# Patient Record
Sex: FEMALE | Race: WHITE | Employment: FULL TIME | URBAN - METROPOLITAN AREA
[De-identification: names, ages, dates, MRNs, and addresses within clinical notes are randomized per-mention and may not be internally consistent; named-entity substitution may affect disease eponyms.]

---

## 2019-09-16 ENCOUNTER — OFFICE VISIT (OUTPATIENT)
Dept: ORTHOPEDIC SURGERY | Age: 54
End: 2019-09-16

## 2019-09-16 VITALS
HEART RATE: 83 BPM | OXYGEN SATURATION: 97 % | BODY MASS INDEX: 34.13 KG/M2 | TEMPERATURE: 98.2 F | SYSTOLIC BLOOD PRESSURE: 129 MMHG | WEIGHT: 225.2 LBS | DIASTOLIC BLOOD PRESSURE: 87 MMHG | RESPIRATION RATE: 12 BRPM | HEIGHT: 68 IN

## 2019-09-16 DIAGNOSIS — R29.898 RIGHT LEG WEAKNESS: ICD-10-CM

## 2019-09-16 DIAGNOSIS — M54.16 LUMBAR RADICULOPATHY: Primary | ICD-10-CM

## 2019-09-16 DIAGNOSIS — M51.36 DDD (DEGENERATIVE DISC DISEASE), LUMBAR: ICD-10-CM

## 2019-09-16 DIAGNOSIS — M47.816 LUMBAR FACET ARTHROPATHY: ICD-10-CM

## 2019-09-16 RX ORDER — IBUPROFEN 800 MG/1
1 TABLET ORAL
COMMUNITY
Start: 2019-07-25 | End: 2020-12-22

## 2019-09-16 RX ORDER — METHYLPREDNISOLONE 4 MG/1
TABLET ORAL
Qty: 1 DOSE PACK | Refills: 0 | Status: SHIPPED | OUTPATIENT
Start: 2019-09-16 | End: 2019-11-04 | Stop reason: ALTCHOICE

## 2019-09-16 RX ORDER — DIAZEPAM 2 MG/1
1 TABLET ORAL
COMMUNITY
Start: 2019-07-24

## 2019-09-16 RX ORDER — TRAMADOL HYDROCHLORIDE 50 MG/1
50 TABLET ORAL
COMMUNITY
Start: 2019-05-11 | End: 2020-03-31 | Stop reason: SDUPTHER

## 2019-09-16 RX ORDER — PREDNISONE 5 MG/1
TABLET ORAL
COMMUNITY
Start: 2019-08-19 | End: 2019-09-16 | Stop reason: ALTCHOICE

## 2019-09-16 RX ORDER — TOPIRAMATE 25 MG/1
TABLET ORAL
Qty: 90 TAB | Refills: 1 | Status: ON HOLD | OUTPATIENT
Start: 2019-09-16 | End: 2019-11-20

## 2019-09-16 RX ORDER — ERGOCALCIFEROL 1.25 MG/1
50000 CAPSULE ORAL
COMMUNITY
Start: 2019-05-07

## 2019-09-16 NOTE — PROGRESS NOTES
[facility]  Valerie Good[address] [place] [address], [facility] [address]  Phone: [phone]  Fax: [phone]    NEW PATIENT  Pt's YOB: 1965    ASSESSMENT   Diagnoses and all orders for this visit:    1. Lumbar radiculopathy  -     REFERRAL TO PHYSICAL THERAPY  -     topiramate (TOPAMAX) 25 mg tablet; Take 1 in the evening for 1 week, then increase to 2 the second week and continue with 3 in the evening    2. Right leg weakness  -     REFERRAL TO PHYSICAL THERAPY    3. DDD (degenerative disc disease), lumbar  -     REFERRAL TO PHYSICAL THERAPY    4. Lumbar facet arthropathy  -     REFERRAL TO PHYSICAL THERAPY  -     methylPREDNISolone (MEDROL DOSEPACK) 4 mg tablet; Per dose pack instructions         IMPRESSION AND PLAN:  Hilda Villeda is a 48 y.o. female with history of neck and lower back pain. Pt complains of constant pain in the right calf when sleeping and notes that she generally wakes up during the night due to the pain. She admits to weakness in the legs when sitting for prolonged periods of time. Pt has been taking ibuprofen as needed. 1) Pt was given information on herniated disc exercises. 2) She was referred [facility] for lumbar pain stabilization and core strengthening. 3) Discussed ordering a right lower extremity EMG if weakness persists or worsens. 4) Pt was prescribed Topamax 25 mg 3 tabs QHS, tapering up as directed. 5) A lumbar MRI was ordered. She has progressive right leg weakness x 9 months in an L5 distribution. 6) Ms. Gabby Nails has a reminder for a \"due or due soon\" health maintenance. I have asked that she contact her primary care provider, Xochitl Jaramillo MD, for follow-up on this health maintenance. 7)  demonstrated consistency with prescribing. Follow-up and Dispositions    · Return in about 4 weeks (around 10/14/2019) for PT follow up, Medication follow up.            HISTORY OF PRESENT ILLNESS:  Franck Perez Desire Rice is a 48 y.o. female with history of neck and lower back pain. Pt presents to the office today as a new patient referred by Yousuf Ospina MD. Pt complains of constant pain in the right calf when sleeping and notes that she generally wakes up during the night due to the pain. Her symptoms worsen when sitting, lying down, coughing/sneezing, and lifting and improve when standing, walking, changing positions, and bending forward. Pt admits that her feet are constantly cool to touch but she denies any numbness in the feet. She notes that she fractured her left shoulder in 2006 which improved with physical therapy and a steroid injection. Pt reports that she then noticed right calf pain starting in 01/2019, followed up with her PCP, Dr. Jenn Sutton, and had a vascular ablation in 04/2019. She notes that her calf pain continued after the ablation so she was referred to [facility]. Pt admits to weakness in the legs when sitting for prolonged periods of time. She notes neck pain since 09/13/2019 and states that she originally thought the pain was due how she slept the night before. Pt has been taking ibuprofen as needed. She admits to relief when taking oral steroids. Pt at this time desires to proceed with medication evaluation and physical therapy. 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     Pain Scale: 10 - Worst pain ever/10     PCP: Yousuf Ospina MD    Past Medical History:   Diagnosis Date    Endocrine disease     hypothyrodism    Environmental allergies     Hypertension         Social History     Socioeconomic History    Marital status:      Spouse name: Not on file    Number of children: Not on file    Years of education: Not on file    Highest education level: Not on file   Occupational History    Not on file   Social Needs    Financial resource strain: Not on file    Food insecurity:     Worry: Not on file     Inability: Not on file    Transportation needs:     Medical: Not on file     Non-medical: Not on file   Tobacco Use    Smoking status: Never Smoker    Smokeless tobacco: Never Used   Substance and Sexual Activity    Alcohol use: No    Drug use: No    Sexual activity: Not on file   Lifestyle    Physical activity:     Days per week: Not on file     Minutes per session: Not on file    Stress: Not on file   Relationships    Social connections:     Talks on phone: Not on file     Gets together: Not on file     Attends [employer] service: Not on file     Active member of club or organization: Not on file     Attends meetings of clubs or organizations: Not on file     Relationship status: Not on file    Intimate partner violence:     Fear of current or ex partner: Not on file     Emotionally abused: Not on file     Physically abused: Not on file     Forced sexual activity: Not on file   Other Topics Concern     Service Not Asked    Blood Transfusions Not Asked    Caffeine Concern Not Asked    Occupational Exposure Not Asked   [place] Hazards Not Asked    Sleep Concern Not Asked    Stress Concern Not Asked    Weight Concern Not Asked    Special Diet Not Asked    Back Care Not Asked    Exercise Not Asked    Bike Helmet Not Asked    Seat Belt Not Asked    Self-Exams Not Asked   Social History Narrative    Not on file       Current Outpatient Medications   Medication Sig Dispense Refill    ergocalciferol (ERGOCALCIFEROL) 50,000 unit capsule Take 50,000 Units by mouth every seven (7) days.  ibuprofen (MOTRIN) 800 mg tablet Take 1 Tab by mouth every eight (8) hours as needed.  topiramate (TOPAMAX) 25 mg tablet Take 1 in the evening for 1 week, then increase to 2 the second week and continue with 3 in the evening 90 Tab 1    methylPREDNISolone (MEDROL DOSEPACK) 4 mg tablet Per dose pack instructions 1 Dose Pack 0    lisinopril (PRINIVIL, ZESTRIL) 20 mg tablet Take 1 Tab by mouth daily.       levothyroxine (SYNTHROID) 125 mcg tablet Take 125 mcg by mouth Daily (before breakfast).  loratadine (CLARITIN) 10 mg tablet Take 1 Tab by mouth daily.  diazePAM (VALIUM) 2 mg tablet Take 1 Tab by mouth daily as needed.  traMADol (ULTRAM) 50 mg tablet Take 50 mg by mouth every four (4) hours as needed.  HYDROcodone-acetaminophen (NORCO) 5-325 mg per tablet Take 1 Tab by mouth every four (4) hours as needed for Pain. Max Daily Amount: 6 Tabs. (Patient not taking: Reported on 9/16/2019) 12 Tab 0    FLUoxetine (PROZAC) 20 mg capsule Take 20 mg by mouth daily. Allergies   Allergen Reactions    Acetaminophen Other (comments)    Aspirin Shortness of Breath    Morphine Shortness of Breath    Penicillins Unknown (comments)       REVIEW OF SYSTEMS    Constitutional: Negative for fever, chills, or weight change. Respiratory: Negative for cough or shortness of breath. Cardiovascular: Negative for chest pain or palpitations. Gastrointestinal: Negative for acid reflux, change in bowel habits, or constipation. Genitourinary: Negative for dysuria and flank pain. Musculoskeletal: Positive for lumbar pain and right leg weakness. Skin: Negative for rash. Neurological: Negative for headaches, dizziness, or numbness. Endo/Heme/Allergies: Negative for increased bruising. Psychiatric/Behavioral: Positive for difficulty with sleep. PHYSICAL EXAMINATION  Visit Vitals  /87   Pulse 83   Temp 98.2 °F (36.8 °C) (Oral)   Resp 12   Ht 5' 7.5\" (1.715 m)   Wt 225 lb 3.2 oz (102.2 kg)   SpO2 97%   BMI 34.75 kg/m²       Constitutional: Awake, alert, and in no acute distress. HEENT: Normocephalic. Atraumatic. Oropharynx is moist and clear. PERRL. EOMI. Sclerae are nonicteric  Cardiovascular: Regular rate and rhythm  Lungs: Clear to auscultation bilaterally  Abdomen: Soft and nontender. Bowel sounds are present  Neurological: 1+ symmetrical DTRs in the upper extremities. 1+ symmetrical DTRs in the lower extremities.  Sensation to light touch is intact. Negative Andrade's sign bilaterally. Skin: warm, dry, and intact. Musculoskeletal: Tenderness to palpation in the lower lumbar region, Moderate pain with extension and axial loading. Improvement with forward flexion. No pain with internal or external rotation of her hips. Positive straight leg raise on the right; negative on the left. Difficulty with heel and toe walking. No difficulty with the single leg stand bilaterally. Biceps  Triceps Deltoids Wrist Ext Wrist Flex Hand Intrin   Right +4/5 +4/5 +4/5 +4/5 +4/5 +4/5   Left +4/5 +4/5 +4/5 +4/5 +4/5 +4/5      Hip Flex  Quads Hamstrings Ankle DF EHL Ankle PF   Right +4/5 +4/5 +4/5 -4/5 +4/5 +4/5   Left +4/5 +4/5 +4/5 +4/5 +4/5 +4/5     IMAGING:    Lumbar spine x-rays from 07/23/2019 were personally reviewed with the patient and demonstrated:  FINDINGS:    VERTEBRAE AND ALIGNMENT: Segments are normal in height. 1 mm anterolisthesis is present at L4-5, alignment is otherwise within normal range. DISC SPACES: L2-3, L3-4, L4-5 discs are modestly narrowed with small marginal osteophytes. L1-2 and L5-S1 discs are slightly narrowed. PARASPINOUS SOFT TISSUES: Unremarkable. ADDITIONAL: Multilevel facet arthropathy is present, most notable on the left at L4-5 and L5-S1. No pars defects. IMPRESSION    Multilevel degenerative disc disease and facet arthropathy. Minimal grade 1 degenerative anterolisthesis at L4-5. Lumbar spine MRI from 04/24/2019 was personally reviewed with the patient and demonstrated:  IMPRESSION:  Multilevel spondylotic changes as described below. Comment: Multiplanar, multisequence magnetic resonance images of the lumbar spine are produced for evaluation. Spinal cord, conus medullaris, and cauda equina maintain normal signal characteristics and appearance. The L2-L3 level sows slight intraspinal disk protrusion but without evidence of the neural structural impingements.     The L3-L4 disk shows mild posterior intraspinal disk protrusion but without evidence of neural structure impressions. The L4-L5 disk shows right lateral protrusion with impression on the right L4 nerve root. The L5-S1 disk shows left posterolateral annular tear but without evidence of neural structure impressions. Generalized decreased T2 signal or noted within the L2-L3, L3-L4, L4-L5, and L5-S1 disks. There is narrowing of the L2-L3 and L3-L4 disk spaces in line with degenerative changes. Anterior extraspinal protrusions of the L2-L3, L3-L4, and L4-L5 disks are preserved. Endplate spurrings are noted anteriorly adjacent to these disk spaces. The right L4-L5 facet joint  Shows an extraspinal small synovial cyst.    The left L4-L5 and L5-S1 facets showed extraspinal synovial cysts. Prevertebral, paraspinal, and postvertebral  Soft tissues are otherwise unremarkable. Osseous structures and marrow spaces are without evidence of suspicious signal abnormalities. Focal fatty change is incidentally noted within the visualized portion of the T11 posterior central vertebral body. Written by Yordy Norris, as dictated by Castillo Catherine MD.  I, Dr. Castillo Catherine confirm that all documentation is accurate.

## 2019-09-16 NOTE — PATIENT INSTRUCTIONS
Herniated Disc: Exercises  Introduction  Here are some examples of exercises for you to try. The exercises may be suggested for a condition or for rehabilitation. Start each exercise slowly. Ease off the exercises if you start to have pain. You will be told when to start these exercises and which ones will work best for you. How to stay safe  These exercises can help you move easier and feel better. But when you first start doing them, you may have more pain in your back. This is normal. But it is important to pay close attention to your pain during and after each exercise. · Keep doing these exercises if your pain stays the same or moves from your leg and buttock more toward the middle of your spine. Pain moving out of your leg and buttock is a good sign. · Stop doing these exercises if your pain gets worse in your leg and buttock. Stop if you start to have pain in your leg and buttock that you didn't have before. Be sure to do these exercises in the order they appear. Note how your pain changes before you move to the next one. If your pain is much worse right after exercise and stays worse the next day, do not do any of these exercises. 1. Rest on belly  1. Rest on belly    1. Lie on your stomach, with your head turned to the side. ? Keep your arms beside your body. ? If that position bothers your neck, place your hands, one on top of the other, underneath your forehead. This will help support your head and neck. 2. Try to relax your lower back muscles as much as you can. 3. Continue to lie on your stomach for 2 minutes. 2. Press-up back extension    1. Lie on your stomach, with your face down and your elbows tucked into your sides and under your shoulders. 2. Press your elbows down into the floor to raise your upper back. ? As you do this, relax your stomach muscles and allow your back to arch without using your back muscles. ? Let your low back relax completely as you arch up.  Don't let your hips or pelvis come off the floor. 3. Hold this position for 15 to 30 seconds. Then relax, and return to the start position. Over time, work up to staying in the press-up position for up to 2 minutes. 4. Repeat 2 to 4 times. 3. Full press-up back extension    1. Lie on your stomach with your face down, keeping your elbows tucked into your sides and under your shoulders. 2. Straighten your elbows, and push your upper body up as far as you can. Allow your lower back to sag. Keep your hips, pelvis, and legs relaxed. 3. Hold this position for 5 seconds, and then relax. 4. Repeat 10 times. Each time, try to raise your upper body a little higher and hold your arms a bit straighter. 4. Backward bend    1. Stand with your feet hip-width apart, and don't lock your knees. Your toes should be pointing forward. 2. Place your hands in the small of your back. 3. Bend backward as far as you can, keeping your knees straight. Hold this position for 2 to 3 seconds. Then return to your starting position. 4. Repeat 2 to 4 times. Each time, try to bend backward a little farther, until you bend backward as far as you can. Follow-up care is a key part of your treatment and safety. Be sure to make and go to all appointments, and call your doctor if you are having problems. It's also a good idea to know your test results and keep a list of the medicines you take. Where can you learn more? Go to http://rei-ayan.info/. Enter [phone] in the search box to learn more about \"Herniated Disc: Exercises. \"  Current as of: September 20, 2018  Content Version: 12.1  © 0741-2239 Healthwise, [employer]. Care instructions adapted under license by [employer] (which disclaims liability or warranty for this information).  If you have questions about a medical condition or this instruction, always ask your healthcare professional. [employer] disclaims any warranty or liability for your use of this information.

## 2019-09-16 NOTE — LETTER
9/18/19 Patient: Sri Miller YOB: 1965 Date of Visit: 9/16/2019 Jelly Mckenna MD 
[place] [address] VIA Facsimile: [phone] Dear Jelly Mckenna MD, Thank you for referring Ms. Sri Miller to [facility] for evaluation. My notes for this consultation are attached. If you have questions, please do not hesitate to call me. I look forward to following your patient along with you. Sincerely, Harmony Almeida MD

## 2019-09-30 ENCOUNTER — HOSPITAL ENCOUNTER (OUTPATIENT)
Dept: PHYSICAL THERAPY | Age: 54
Discharge: HOME | End: 2019-09-30
Payer: OTHER GOVERNMENT

## 2019-09-30 PROCEDURE — 97161 PT EVAL LOW COMPLEX 20 MIN: CPT

## 2019-09-30 PROCEDURE — 97110 THERAPEUTIC EXERCISES: CPT

## 2019-09-30 PROCEDURE — 97530 THERAPEUTIC ACTIVITIES: CPT

## 2019-09-30 NOTE — PROGRESS NOTES
[facility]  [address] [facility] [address]  [place], [address]  [phone] fax    Plan of Care/ Statement of Necessity for Physical Therapy Services  Patient name: Kim Arias Start of Care: 2019   Referral source: Luther Thompson MD : 1965    Medical Diagnosis: Low back pain [M54.5]  Payor:  / Plan: Elizabeth Kilpatrick / Product Type: Basil Betancourt /  Onset Date: 2019    Treatment Diagnosis: LBP, right LE pain and numbness   Prior Hospitalization: see medical history Provider#: [id number]   Medications: Verified on Patient summary List    Comorbidities: depression, HTN, thyroid problems, venous reflux - had ablation on right LE in 2019, hx LBP   Prior Level of Function: Independent with ADLs, functional, and work tasks with no limitations     The Plan of Care and following information is based on the information from the initial evaluation. Assessment/ key information:   Pt is a 48year old female who presents to therapy today with LBP with right LE pain/numbness. Pt states that her symptoms began in 2019 with insidious onset. Pt reports getting a new mattress in 2019 as well. Pt reports having pain on the lateral lower right LE that only occurs at night. Pt reports having venous reflux and had an ablation on the right LE in 2019. Pt reports that this ablation helped with some of the pain but it is still present at night. Pt reports taking the medications that the MD prescribed which does help some of the LE pain but reports worsening of her LBP. Pt states she woke up one morning a few weeks ago with neck pain as well but stated that the meds helped with this. Pt also states she noticed tingling in B hands/feet yesterday with yard work tasks (states she had these symptoms before but yesterday was the first day it was consistent). Pt reports having foot drop on the right LE at times.  Pt demonstrated decreased strength, impaired core stability, and impaired reflexes. Unable facilitate B UE/LE reflexes except B achilles which was 1+. Pt denies having any bowel/bladder incontinence but reports having urgency of bladder symptoms but reports she does not void herself at times when she has this urgency. Negative B harvey's sign noted. Significant weakness is noted with MMT to the right LE and right shoulder flex/ABD. Pt would benefit from physical therapy to improve the above impairments to help the pt return to performing ADLs, functional and recreational activities. Evaluation Complexity History HIGH Complexity :3+ comorbidities / personal factors will impact the outcome/ POC ; Examination MEDIUM Complexity : 3 Standardized tests and measures addressing body structure, function, activity limitation and / or participation in recreation  ;Presentation MEDIUM Complexity : Evolving with changing characteristics  ; Clinical Decision Making LOW Complexity : FOTO score of   Overall Complexity Rating: LOW   Problem List: pain affecting function, decrease ROM, decrease strength, impaired gait/ balance, decrease ADL/ functional abilitiies, decrease activity tolerance, decrease flexibility/ joint mobility and decrease transfer abilities   Treatment Plan may include any combination of the following: Therapeutic exercise, Therapeutic activities, Neuromuscular re-education, Physical agent/modality, Gait/balance training, Manual therapy, Aquatic therapy, Patient education, Self Care training, Functional mobility training, Home safety training and Stair training  Patient / Family readiness to learn indicated by: asking questions, trying to perform skills and interest  Persons(s) to be included in education: patient (P)  Barriers to Learning/Limitations: None  Patient Goal (s): sleeping through the night  Patient Self Reported Health Status: good  Rehabilitation Potential: fair    Short Term Goals:  To be accomplished in 2 treatments:  1. Pt will report compliance and independence to [facility] to help the pt manage their pain and symptoms. Eval: established   Long Term Goals: To be accomplished in 10 treatments: 1. Pt will increase MMT right knee EXT to 4/5, right ankle DF/PF/INV to 4-/5, right knee flex to 4-/5, right hip ABD to 4-/5 to improve ability to tolerate work tasks. 2. Pt will report being able to sleep through the night without awakening secondary to pain in the low back or right LE to improve sleep quality. 3. Pt will report having no stumbles over the past month secondary to foot drop to improve safety with gait. 4. Pt will report at least 50% improvement in overall symptoms/pain to improve activity tolerance and ease of ADLs. Frequency / Duration: Patient to be seen 1-2 times per week for 10 treatments. Patient/ Caregiver education and instruction: Diagnosis, prognosis, self care, activity modification and exercises   [x]  Plan of care has been reviewed with JESSICA Parsons, PT 9/30/2019 6:30 PM  _____________________________________________________________________  I certify that the above Therapy Services are being furnished while the patient is under my care. I agree with the treatment plan and certify that this therapy is necessary.     Physician's Signature:____________________  Date:__________Time:______    Please sign and return to [facility]  [address] [facility] [facility] [address]  [place], [address]  [phone] fax

## 2019-09-30 NOTE — PROGRESS NOTES
PT DAILY TREATMENT NOTE  10-18    Patient Name: Lincoln Stanton  Date:2019  : 1965  [x]  Patient  Verified  Payor: KEELEY / Plan: Gonsalo Raines 76 / Product Type: [place] /    In time:5:05  Out time:6:14  Total Treatment Time (min): 69  Visit #: 1 of 10    Medicare/BCBS Only   Total Timed Codes (min):  n/a 1:1 Treatment Time:  n/a     Treatment Area: Low back pain [M54.5]    SUBJECTIVE  Pain Level (0-10 scale): 4  Any medication changes, allergies to medications, adverse drug reactions, diagnosis change, or new procedure performed?: [x] No    [] Yes (see summary sheet for update)  Subjective functional status/changes:   [] No changes reported  See POC    OBJECTIVE    34 min [x]Eval                  []Re-Eval     10 min Therapeutic Exercise:  [] See flow sheet : HEP instruction and demonstration   Rationale: increase ROM and increase strength to improve the patients ability to tolerate ADLs    25 min Therapeutic Activity:  []  See flow sheet : pt education regarding anatomy and physiology of the spinal core/spine/LEs/UEs and how it relates to the pt's condition, pt education on how aquatic therapy can help improve pain and mobility, pt education on monitoring her symptoms in her B hands/feet and to contact the MD regarding this and her neck pain, pt education on avoiding activities that increase her pain/symptoms   Rationale: increase ROM, increase strength and decrease pain/symptoms  to improve the patients ability to tolerate functional tasks. With   [x] TE   [x] TA   [] neuro   [] other: Patient Education: [x] Review HEP    [] Progressed/Changed HEP based on:   [] positioning   [] body mechanics   [] transfers   [] heat/ice application    [] other:      Other Objective/Functional Measures: See evaluation. Pain Level (0-10 scale) post treatment: 4    ASSESSMENT/Changes in Function: Pt given HEP handout to perform. Pt understood exercises in HEP handout.  Pt demonstrated decreased strength, impaired core stability, and impaired reflexes. Unable facilitate B UE/LE reflexes except B achilles which was 1+. Pt denies having any bowel/bladder incontinence but reports having urgency of bladder symptoms but reports she does not void herself at times when she has this urgency. Negative B harvey's sign noted. Significant weakness is noted with MMT to the right LE and right shoulder flex/ABD. Educated pt to mention her neck symptoms to her MD along with the B hand/feet tingling. Pt would benefit from physical therapy to improve the above impairments to help the pt return to performing ADLs, functional and recreational activities. Patient will continue to benefit from skilled PT services to modify and progress therapeutic interventions, address functional mobility deficits, address ROM deficits, address strength deficits, analyze and address soft tissue restrictions, analyze and cue movement patterns, analyze and modify body mechanics/ergonomics, assess and modify postural abnormalities, address imbalance/dizziness and instruct in home and community integration to attain remaining goals. [x]  See Plan of Care  []  See progress note/recertification  []  See Discharge Summary         Progress towards goals / Updated goals:  Short Term Goals: To be accomplished in 2 treatments:  1. Pt will report compliance and independence to [facility] to help the pt manage their pain and symptoms. Eval: established   Long Term Goals: To be accomplished in 10 treatments: 1. Pt will increase MMT right knee EXT to 4/5, right ankle DF/PF/INV to 4-/5, right knee flex to 4-/5, right hip ABD to 4-/5 to improve ability to tolerate work tasks. 2. Pt will report being able to sleep through the night without awakening secondary to pain in the low back or right LE to improve sleep quality. 3. Pt will report having no stumbles over the past month secondary to foot drop to improve safety with gait.    4. Pt will report at least 50% improvement in overall symptoms/pain to improve activity tolerance and ease of ADLs.      PLAN  [x]  Upgrade activities as tolerated     [x]  Continue plan of care  [x]  Update interventions per flow sheet       []  Discharge due to:_  []  Other:_      Marli Kelly, PT 9/30/2019  6;30 PM    Future Appointments   Date Time Provider Gian Baxter   9/30/2019  5:00 PM Elizabeth Greer PT [facility]   11/4/2019  2:15 PM Anatoly Leal  [address] [facility]

## 2019-10-04 ENCOUNTER — HOSPITAL ENCOUNTER (OUTPATIENT)
Dept: PHYSICAL THERAPY | Age: 54
Discharge: HOME | End: 2019-10-04
Payer: OTHER GOVERNMENT

## 2019-10-04 PROCEDURE — 97113 AQUATIC THERAPY/EXERCISES: CPT

## 2019-10-04 NOTE — PROGRESS NOTES
PT DAILY TREATMENT NOTE  10-18    Patient Name: Cinthia Manriquez  Date:10/4/2019  : 1965  [x]  Patient  Verified  Payor: KEELEY / Plan: Gonsalo Raines 76 / Product Type:  /    In time: 5:02   Out time: 5:35  Total Treatment Time (min): 33  Visit #: 2 of 10    Medicare/BCBS Only   Total Timed Codes (min):  n/a 1:1 Treatment Time:  n/a     Treatment Area: Low back pain [M54.5]    SUBJECTIVE  Pain Level (0-10 scale): 4  Any medication changes, allergies to medications, adverse drug reactions, diagnosis change, or new procedure performed?: [x] No    [] Yes (see summary sheet for update)  Subjective functional status/changes:   [] No changes reported  Pt reports fatigue with HEP exercises. Pt reports she notices increased tingling in her B feet (right>left) and B hands (right=left) as well. Pt reports that this tingling is sharp needles in the right foot and B hands. Pt reports noticing stress incontinence when she sneezes/laughs and has been watching her water intake from seeing a nutritionist. Pt reports she was bit by a tick over the summer as well. Pt states she sees the MD on Monday. OBJECTIVE    33 min Therapeutic Exercise:  [x] See flow sheet : aqautics   Rationale: increase ROM and increase strength to improve the patients ability to tolerate ADLs          With   [x] TE   [x] TA   [] neuro   [] other: Patient Education: [x] Review HEP    [] Progressed/Changed HEP based on:   [] positioning   [] body mechanics   [] transfers   [] heat/ice application    [] other:      Other Objective/Functional Measures: Initiated exercises/interventions in flow sheet. Pain Level (0-10 scale) post treatment: 0    ASSESSMENT/Changes in Function: Reported no pain post session today. Educated pt to monitor her symptoms over the next few days and to mention these symptoms to her MD when she sees him on Monday. Educated pt to contact the MD before then if her symptoms worsen.  Mild-moderate instability noted with paddles exercises. We will plan on continuing therapy as tolerated to improve mobility and strength. Continue POC as tolerated. Patient will continue to benefit from skilled PT services to modify and progress therapeutic interventions, address functional mobility deficits, address ROM deficits, address strength deficits, analyze and address soft tissue restrictions, analyze and cue movement patterns, analyze and modify body mechanics/ergonomics, assess and modify postural abnormalities, address imbalance/dizziness and instruct in home and community integration to attain remaining goals. []  See Plan of Care  []  See progress note/recertification  []  See Discharge Summary         Progress towards goals / Updated goals:   Short Term Goals: To be accomplished in 2 treatments:  1. Pt will report compliance and independence to HEP to help the pt manage their pain and symptoms. Long Term Goals: To be accomplished in 10 treatments: 1. Pt will increase MMT right knee EXT to 4/5, right ankle DF/PF/INV to 4-/5, right knee flex to 4-/5, right hip ABD to 4-/5 to improve ability to tolerate work tasks. 2. Pt will report being able to sleep through the night without awakening secondary to pain in the low back or right LE to improve sleep quality. 3. Pt will report having no stumbles over the past month secondary to foot drop to improve safety with gait. 4. Pt will report at least 50% improvement in overall symptoms/pain to improve activity tolerance and ease of ADLs.      PLAN  [x]  Upgrade activities as tolerated     [x]  Continue plan of care  [x]  Update interventions per flow sheet       []  Discharge due to:_  []  Other:_      Kalina Anna, PT 10/4/2019  5:09 PM    Future Appointments   Date Time Provider Gian Baxter   10/11/2019  5:30 PM Mily Ragsdale, PT [facility]   10/14/2019 12:45 PM Mirella Polanco, PT [facility]   10/15/2019  4:30 PM Meryl Ramirez, PT [facility] 10/21/2019  5:00 PM Curtistine Awkward Dail Cogan, PT [facility]   10/25/2019  4:30 PM Curtistine Awkward Dail Cogan, PT [facility]   10/28/2019  5:00 PM Tawanda Skelton, PT [facility]   10/30/2019  5:00 PM Tawanda Skelton, PT [facility]   11/4/2019  2:15 PM Sherlyn Eisenberg  [address] [facility]

## 2019-10-11 ENCOUNTER — APPOINTMENT (OUTPATIENT)
Dept: PHYSICAL THERAPY | Age: 54
End: 2019-10-11
Payer: OTHER GOVERNMENT

## 2019-10-14 ENCOUNTER — APPOINTMENT (OUTPATIENT)
Dept: PHYSICAL THERAPY | Age: 54
End: 2019-10-14
Payer: OTHER GOVERNMENT

## 2019-10-15 ENCOUNTER — APPOINTMENT (OUTPATIENT)
Dept: PHYSICAL THERAPY | Age: 54
End: 2019-10-15
Payer: OTHER GOVERNMENT

## 2019-10-21 ENCOUNTER — HOSPITAL ENCOUNTER (OUTPATIENT)
Dept: PHYSICAL THERAPY | Age: 54
Discharge: HOME | End: 2019-10-21
Payer: OTHER GOVERNMENT

## 2019-10-21 PROCEDURE — 97113 AQUATIC THERAPY/EXERCISES: CPT

## 2019-10-21 NOTE — PROGRESS NOTES
PT DAILY TREATMENT NOTE  10-18    Patient Name: David Leal  Date:10/21/2019  : 1965  [x]  Patient  Verified  Payor:  / Plan: [facility]  [facility] [place] / Product Type:  /    In time: 5:01   Out time: 5:29  Total Treatment Time (min): 28  Visit #: 3 of 10    Medicare/BCBS Only   Total Timed Codes (min):  n/a 1:1 Treatment Time:  n/a     Treatment Area: Low back pain [M54.5]    SUBJECTIVE  Pain Level (0-10 scale): 6  Any medication changes, allergies to medications, adverse drug reactions, diagnosis change, or new procedure performed?: [x] No    [] Yes (see summary sheet for update)  Subjective functional status/changes:   [] No changes reported  Pt reports she is getting a MRI on her back and neck on Wednesday 10/23/2019. Pt reports she has noticed facial tingling as well. Pt reports fatigue with HEP exercises. OBJECTIVE    28 min Therapeutic Exercise:  [x] See flow sheet : aqautics   Rationale: increase ROM and increase strength to improve the patients ability to tolerate ADLs          With   [x] TE   [] TA   [] neuro   [] other: Patient Education: [x] Review HEP    [] Progressed/Changed HEP based on:   [] positioning   [] body mechanics   [] transfers   [] heat/ice application    [] other:      Other Objective/Functional Measures: good stability noted with paddles horizontal ABD/ADD. Pain Level (0-10 scale) post treatment: 5    ASSESSMENT/Changes in Function: Reported mild improvement in pain post session. Reported right lateral lower LE pain with standing exercises in the pool. Pt continues to demonstrate symptoms that may not be musculoskeletal in nature and educated pt to continue to mention her symptoms to her MD. Continue POC as tolerated.     Patient will continue to benefit from skilled PT services to modify and progress therapeutic interventions, address functional mobility deficits, address ROM deficits, address strength deficits, analyze and address soft tissue restrictions, analyze and cue movement patterns, analyze and modify body mechanics/ergonomics, assess and modify postural abnormalities, address imbalance/dizziness and instruct in home and community integration to attain remaining goals. []  See Plan of Care  []  See progress note/recertification  []  See Discharge Summary         Progress towards goals / Updated goals:   Short Term Goals: To be accomplished in 2 treatments:  1. Pt will report compliance and independence to [facility] to help the pt manage their pain and symptoms. reports compliance but states having fatigue with exercises 10/21/2019  Long Term Goals: To be accomplished in 10 treatments: 1. Pt will increase MMT right knee EXT to 4/5, right ankle DF/PF/INV to 4-/5, right knee flex to 4-/5, right hip ABD to 4-/5 to improve ability to tolerate work tasks. 2. Pt will report being able to sleep through the night without awakening secondary to pain in the low back or right LE to improve sleep quality. 3. Pt will report having no stumbles over the past month secondary to foot drop to improve safety with gait. 4. Pt will report at least 50% improvement in overall symptoms/pain to improve activity tolerance and ease of ADLs.      PLAN  [x]  Upgrade activities as tolerated     [x]  Continue plan of care  [x]  Update interventions per flow sheet       []  Discharge due to:_  []  Other:_      Josetta Alpers, PT 10/21/2019  5:11 PM    Future Appointments   Date Time Provider Gian Baxter   10/21/2019  5:00 PM Tiera Molina, PT [facility]   10/25/2019  4:30 PM Tyra Lyons, PT [facility]   10/28/2019  5:00 PM Tiera Molina, PT [facility]   10/30/2019  5:00 [facility]   11/4/2019  2:15 PM Ivett Egan  [address]

## 2019-10-25 ENCOUNTER — HOSPITAL ENCOUNTER (OUTPATIENT)
Dept: PHYSICAL THERAPY | Age: 54
Discharge: HOME | End: 2019-10-25
Payer: OTHER GOVERNMENT

## 2019-10-25 PROCEDURE — 97113 AQUATIC THERAPY/EXERCISES: CPT

## 2019-10-25 NOTE — PROGRESS NOTES
PT DAILY TREATMENT NOTE  10-18    Patient Name: Pablo Winston  Date:10/25/2019  : 1965  [x]  Patient  Verified  Payor:  / Plan: [facility]  [facility] / Product Type:  /    In time: 4:30   Out time: 5:01  Total Treatment Time (min): 31  Visit #: 4 of 10    Medicare/BCBS Only   Total Timed Codes (min):  n/a 1:1 Treatment Time:  n/a     Treatment Area: Low back pain [M54.5]    SUBJECTIVE  Pain Level (0-10 scale): 8 pain and sore in the low back  Any medication changes, allergies to medications, adverse drug reactions, diagnosis change, or new procedure performed?: [x] No    [] Yes (see summary sheet for update)  Subjective functional status/changes:   [] No changes reported  Pt states her pain is elevated today. Pt reports having increased tingling in the B feet>hands today. Pt reports having the MRI done and is awaiting the results. OBJECTIVE    31 min Therapeutic Exercise:  [x] See flow sheet : aqautics   Rationale: increase ROM and increase strength to improve the patients ability to tolerate ADLs          With   [x] TE   [] TA   [] neuro   [] other: Patient Education: [x] Review HEP    [] Progressed/Changed HEP based on:   [] positioning   [] body mechanics   [] transfers   [] heat/ice application    [] other:      Other Objective/Functional Measures: Held TM secondary to pt request. Added UTR to improve mobility. Reported tingling in the left foot with hipx3 ABD on the left. Performed open paddles with EXT/horizontal ABD/ADD today to improve balance and stability. Pain Level (0-10 scale) post treatment: 6    ASSESSMENT/Changes in Function: Reported improvement in pain post session. Pt does seem somewhat frustrated with limited progress towards pain and symptoms since the start of care. Dicussed with pt at length regarding her current symptoms and talking to her MD regarding her MRI results.  Pt reported that she did notice increased pain with AROM l/s EXT and less pain with flex AROM today and educated pt on performing repeated flex at home to improve pain and symptoms. We will plan on reassessing pt NV for PN. Continue POC as tolerated. Patient will continue to benefit from skilled PT services to modify and progress therapeutic interventions, address functional mobility deficits, address ROM deficits, address strength deficits, analyze and address soft tissue restrictions, analyze and cue movement patterns, analyze and modify body mechanics/ergonomics, assess and modify postural abnormalities, address imbalance/dizziness and instruct in home and community integration to attain remaining goals. []  See Plan of Care  []  See progress note/recertification  []  See Discharge Summary         Progress towards goals / Updated goals:   Short Term Goals: To be accomplished in 2 treatments:  1. Pt will report compliance and independence to HEP to help the pt manage their pain and symptoms. reports compliance but states having fatigue with exercises 10/21/2019  Long Term Goals: To be accomplished in 10 treatments: 1. Pt will increase MMT right knee EXT to 4/5, right ankle DF/PF/INV to 4-/5, right knee flex to 4-/5, right hip ABD to 4-/5 to improve ability to tolerate work tasks. 2. Pt will report being able to sleep through the night without awakening secondary to pain in the low back or right LE to improve sleep quality. 3. Pt will report having no stumbles over the past month secondary to foot drop to improve safety with gait. Continues to have tingling in the feet 10/25/2019  4. Pt will report at least 50% improvement in overall symptoms/pain to improve activity tolerance and ease of ADLs.      PLAN  [x]  Upgrade activities as tolerated     [x]  Continue plan of care  [x]  Update interventions per flow sheet       []  Discharge due to:_  []  Other:_      Hedy Tucker, PT 10/25/2019  4:34 PM    Future Appointments   Date Time Provider Gian Baxter   10/28/2019  5:00 PM Korina Sethi PT [facility]   10/30/2019  5:00 PM Abdi Olvera, PT [facility]   11/4/2019  2:15 PM Debby Peña  [address] 11/11/2019  2:15 PM Nathaniel Balderas, PT [facility]

## 2019-10-28 ENCOUNTER — APPOINTMENT (OUTPATIENT)
Dept: PHYSICAL THERAPY | Age: 54
End: 2019-10-28
Payer: OTHER GOVERNMENT

## 2019-10-30 ENCOUNTER — HOSPITAL ENCOUNTER (OUTPATIENT)
Dept: PHYSICAL THERAPY | Age: 54
End: 2019-10-30
Payer: OTHER GOVERNMENT

## 2019-11-04 ENCOUNTER — OFFICE VISIT (OUTPATIENT)
Dept: ORTHOPEDIC SURGERY | Age: 54
End: 2019-11-04

## 2019-11-04 VITALS
RESPIRATION RATE: 14 BRPM | OXYGEN SATURATION: 96 % | DIASTOLIC BLOOD PRESSURE: 77 MMHG | HEART RATE: 85 BPM | TEMPERATURE: 98.4 F | BODY MASS INDEX: 33.28 KG/M2 | WEIGHT: 219.6 LBS | HEIGHT: 68 IN | SYSTOLIC BLOOD PRESSURE: 117 MMHG

## 2019-11-04 DIAGNOSIS — M48.02 FORAMINAL STENOSIS OF CERVICAL REGION: ICD-10-CM

## 2019-11-04 DIAGNOSIS — R20.0 FACIAL NUMBNESS: ICD-10-CM

## 2019-11-04 DIAGNOSIS — M47.816 LUMBAR FACET ARTHROPATHY: Primary | ICD-10-CM

## 2019-11-04 DIAGNOSIS — R29.898 RIGHT LEG WEAKNESS: ICD-10-CM

## 2019-11-04 DIAGNOSIS — M62.838 MUSCLE SPASM: ICD-10-CM

## 2019-11-04 DIAGNOSIS — M79.18 MYOFASCIAL PAIN: ICD-10-CM

## 2019-11-04 NOTE — LETTER
11/6/19 Patient: Zhang Hernández YOB: 1965 Date of Visit: 11/4/2019 Stepan Borjas MD 
[place] [address] VIA Facsimile: [phone] Dear Stepan Borjas MD, Thank you for referring Ms. Cesar Matos to [facility] for evaluation. My notes for this consultation are attached. If you have questions, please do not hesitate to call me. I look forward to following your patient along with you. Sincerely, Chapis Quinteros MD

## 2019-11-04 NOTE — PATIENT INSTRUCTIONS
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
## 2019-11-11 ENCOUNTER — APPOINTMENT (OUTPATIENT)
Dept: PHYSICAL THERAPY | Age: 54
End: 2019-11-11

## 2019-11-19 NOTE — PROGRESS NOTES
[facility]  [address]  [place], [address]  [phone] fax    Physical Therapy Discharge Summary    Patient name: Alicia Epps Start of Care: 2019   Referral source: Sherley Rasmussen MD : 1965               Medical Diagnosis: Low back pain [M54.5]  Payor:  / Plan: [employer] / Product Type: [employer] /  Onset Date: 2019               Treatment Diagnosis: LBP, right LE pain and numbness   Prior Hospitalization: see medical history Provider#: [id number]   Medications: Verified on Patient summary List    Comorbidities: depression, HTN, thyroid problems, venous reflux - had ablation on right LE in 2019, hx LBP   Prior Level of Function: Independent with ADLs, functional, and work tasks with no limitations    Visits from Start of Care: 4    Missed Visits: 1  Reporting Period : 19 to 10/25/19      Summary of Care: Per chart review,  staff spoke with pt on 19 and pt stated she wanted to discharge from PT. Progress towards goals:   Short Term Goals:   1. Pt will report compliance and independence to HEP to help the pt manage their pain and symptoms. reports compliance but states having fatigue with exercises. Long Term Goals:   1.Pt will increase MMT right knee EXT to 4/5, right ankle DF/PF/INV to 4-/5, right knee flex to 4-/5, right hip ABD to 4-/5 to improve ability to tolerate work tasks. 2. Pt will report being able to sleep through the night without awakening secondary to pain in the low back or right LE to improve sleep quality. 3. Pt will report having no stumbles over the past month secondary to foot drop to improve safety with gait. Continues to have tingling in the feet. 4. Pt will report at least 50% improvement in overall symptoms/pain to improve activity tolerance and ease of ADLs.        ASSESSMENT/RECOMMENDATIONS: Unable to further assess progress towards goals at this time due to non-compliance/lack of attendance. DC at this time with no further instructions to the patient.   Thank you for this referral.    [x]Discontinue therapy: []Patient has reached or is progressing toward set goals      [x]Patient is non-compliant or has abdicated      []Due to lack of appreciable progress towards set goals    Manolo Spivey, PT 11/19/2019 10:13 AM

## 2019-11-20 ENCOUNTER — HOSPITAL ENCOUNTER (OUTPATIENT)
Age: 54
Setting detail: OUTPATIENT SURGERY
Discharge: HOME | End: 2019-11-20
Attending: PHYSICAL MEDICINE & REHABILITATION | Admitting: PHYSICAL MEDICINE & REHABILITATION
Payer: OTHER GOVERNMENT

## 2019-11-20 ENCOUNTER — APPOINTMENT (OUTPATIENT)
Dept: GENERAL RADIOLOGY | Age: 54
End: 2019-11-20
Attending: PHYSICAL MEDICINE & REHABILITATION
Payer: OTHER GOVERNMENT

## 2019-11-20 VITALS
SYSTOLIC BLOOD PRESSURE: 119 MMHG | BODY MASS INDEX: 33.19 KG/M2 | WEIGHT: 219 LBS | HEIGHT: 68 IN | DIASTOLIC BLOOD PRESSURE: 78 MMHG | HEART RATE: 87 BPM | OXYGEN SATURATION: 97 % | RESPIRATION RATE: 18 BRPM | TEMPERATURE: 98.5 F

## 2019-11-20 PROCEDURE — 77030039433 HC TY MYLEOGRAM BD -B: Performed by: PHYSICAL MEDICINE & REHABILITATION

## 2019-11-20 PROCEDURE — 74011250636 HC RX REV CODE- 250/636: Performed by: PHYSICAL MEDICINE & REHABILITATION

## 2019-11-20 PROCEDURE — 74011250637 HC RX REV CODE- 250/637: Performed by: PHYSICAL MEDICINE & REHABILITATION

## 2019-11-20 PROCEDURE — 74011000250 HC RX REV CODE- 250: Performed by: PHYSICAL MEDICINE & REHABILITATION

## 2019-11-20 PROCEDURE — 77030003676 HC NDL SPN MPRI -A: Performed by: PHYSICAL MEDICINE & REHABILITATION

## 2019-11-20 PROCEDURE — 76010000009 HC PAIN MGT 0 TO 30 MIN PROC: Performed by: PHYSICAL MEDICINE & REHABILITATION

## 2019-11-20 RX ORDER — LIDOCAINE HYDROCHLORIDE 10 MG/ML
INJECTION, SOLUTION EPIDURAL; INFILTRATION; INTRACAUDAL; PERINEURAL AS NEEDED
Status: DISCONTINUED | OUTPATIENT
Start: 2019-11-20 | End: 2019-11-20 | Stop reason: HOSPADM

## 2019-11-20 RX ORDER — DEXAMETHASONE SODIUM PHOSPHATE 100 MG/10ML
INJECTION INTRAMUSCULAR; INTRAVENOUS AS NEEDED
Status: DISCONTINUED | OUTPATIENT
Start: 2019-11-20 | End: 2019-11-20 | Stop reason: HOSPADM

## 2019-11-20 RX ORDER — DIAZEPAM 5 MG/1
5-20 TABLET ORAL ONCE
Status: COMPLETED | OUTPATIENT
Start: 2019-11-20 | End: 2019-11-20

## 2019-11-20 RX ADMIN — DIAZEPAM 10 MG: 5 TABLET ORAL at 12:57

## 2019-11-20 NOTE — H&P
Date of Surgery Update:  Henrry Velez was seen and examined. History and physical has been reviewed. The patient has been examined. There have been no significant clinical changes since the last office visit.       Signed By: Radha Courtney MD     November 20, 2019 1:16 PM

## 2019-11-20 NOTE — DISCHARGE INSTRUCTIONS
[facility]   ([place])  Dr. Adela Ahumada, Dr. Aracely Wilkinson, Dr. Vera Barron not drive a car, operate heavy machinery or dangerous equipment for 24 hours. * Activity as tolerated; rest for the remainder of the day. * Resume pre-block medications including those for your family doctor. * Do not drink alcoholic beverages for 24 hours. Alcohol and the medications you have received may interact and cause an adverse reaction. * You may feel better this evening and worse tomorrow, as the numbing medications wears off and the steroid has yet to begin to work. After 48 hrs the steroid should begin to release bringing you relief. * You may shower this evening and remove any bandages. * Avoid hot tubs and heating pads for 24 hours. You may use cold packs on the procedure site as tolerated for the first 24 hours. * If a headache develops, drink plenty of fluids and rest.  Take over the counter medications for headache if needed. If the headache continues longer than 24 hours, call MD at the [address] [place] [address]. [phone]    * Continue taking pain medications as needed. * You may resume your regular diet if tolerated. Otherwise, start with sips of water and advance slowly. * If Diabetic: check your blood sugar three times a day for the next 3 days. If your sugar is greater than 300 call your family doctor. If your sugar is greater than 400, have someone transport you to the nearest Emergency Room. * If you experience any of the following problems, Please Call the [address] [place] [address] at [phone].         * Shortness of Breath    * Fever of 101 or higher    * Nausea / Vomiting    * Severe Headache    * Weakness or numbness in arms or legs that is not      resolving    * Prolonged increase in pain greater than 4 days      DISCHARGE SUMMARY from Nurse      PATIENT INSTRUCTIONS:    After oral sedation, for 24 hours or while taking prescription Narcotics:  · Limit your activities  · Do not drive and operate hazardous machinery  · Do not make important personal or business decisions  · Do  not drink alcoholic beverages  · If you have not urinated within 8 hours after discharge, please contact your surgeon on call. Report the following to your surgeon:  · Excessive pain, swelling, redness or odor of or around the surgical area  · Temperature over 101  · Nausea and vomiting lasting longer than 4 hours or if unable to take medications  · Any signs of decreased circulation or nerve impairment to extremity: change in color, persistent  numbness, tingling, coldness or increase pain  · Any questions            What to do at Home:  Recommended activity: Activity as tolerated, NO DRIVING FOR 12 Hours post injection          *  Please give a list of your current medications to your Primary Care Provider. *  Please update this list whenever your medications are discontinued, doses are      changed, or new medications (including over-the-counter products) are added. *  Please carry medication information at all times in case of emergency situations. These are general instructions for a healthy lifestyle:    No smoking/ No tobacco products/ Avoid exposure to second hand smoke    Surgeon General's Warning:  Quitting smoking now greatly reduces serious risk to your health. Obesity, smoking, and sedentary lifestyle greatly increases your risk for illness    A healthy diet, regular physical exercise & weight monitoring are important for maintaining a healthy lifestyle    You may be retaining fluid if you have a history of heart failure or if you experience any of the following symptoms:  Weight gain of 3 pounds or more overnight or 5 pounds in a week, increased swelling in our hands or feet or shortness of breath while lying flat in bed.   Please call your doctor as soon as you notice any of these symptoms; do not wait until your next office visit. Recognize signs and symptoms of STROKE:    F-face looks uneven    A-arms unable to move or move unevenly    S-speech slurred or non-existent    T-time-call 911 as soon as signs and symptoms begin-DO NOT go       Back to bed or wait to see if you get better-TIME IS BRAIN.

## 2019-11-20 NOTE — PROCEDURES
Facet Joint Block Procedure Note    Patient Name: Israel Louis    Date of Procedure: November 20, 2019    Preoperative Diagnosis: LUMBAR FACET ARTHROPATHY    Post Operative Diagnosis:LUMBAR FACET ARTHROPATHY     Procedure:  bilateral  L4-L5 Facet Joint Block    Consent: Informed consent was obtained prior to the procedure. The patient was given the opportunity to ask questions regarding the procedure and its associated risks. In addition to the potential risks associated with the procedure itself, the patient was informed both verbally and in writing of potential side effects of the use of glucocorticoids. The patient appeared to comprehend the informed consent and desired to have the procedure performed. Procedure: The patient was placed in the prone position on the flouroscopy table and the back was prepped and draped in the usual sterile manner. At each blocked level, the exact location of the facet joint was identified with flouroscopy, and after local Lidocaine 1% injection, a #22 gauge spinal needle was then advanced toward the joint. A total of 10 mg of preservative free Dexamethasone and 5 cc of Lidocaine was injected around and equally divided among all of the sites. The patient tolerated the procedure well. The injection area was cleaned and bandaids applied. No excessive bleeding was noted. Patient dressed and was discharged to home with instructions.        Jose Webster MD  November 20, 2019

## 2019-12-11 ENCOUNTER — OFFICE VISIT (OUTPATIENT)
Dept: ORTHOPEDIC SURGERY | Age: 54
End: 2019-12-11

## 2019-12-11 VITALS
TEMPERATURE: 98.1 F | WEIGHT: 222 LBS | DIASTOLIC BLOOD PRESSURE: 90 MMHG | SYSTOLIC BLOOD PRESSURE: 140 MMHG | HEART RATE: 72 BPM | RESPIRATION RATE: 18 BRPM | HEIGHT: 68 IN | BODY MASS INDEX: 33.65 KG/M2

## 2019-12-11 DIAGNOSIS — R20.0 NUMBNESS AND TINGLING OF BOTH LOWER EXTREMITIES: Primary | ICD-10-CM

## 2019-12-11 DIAGNOSIS — R20.2 NUMBNESS AND TINGLING OF BOTH LOWER EXTREMITIES: Primary | ICD-10-CM

## 2019-12-11 NOTE — PROGRESS NOTES
[address]  [address], [address]  [address]  Phone: [phone]  Fax: [phone]        Ken Pitts  : 1965  PCP: Gilford Lob, MD  2019    ELECTROMYOGRAPHY AND NERVE CONDUCTION STUDIES    Lucius Cantu was referred by Dr. Julieta Bolaños for electrodiagnostic evaluation of BLE paraesthesia. NCV & EMG Findings: All examined muscles (as indicated in the following table) showed no evidence of electrical instability. INTERPRETATION  This was a normal nerve conduction and EMG study showing there to be no signs of neuropathy, myopathy, or radiculopathy in the nerves and muscles tested. TECHNICAL LIMITATIONS  Soft tissue at points of stimulation resulted in distally low amplitudes, particularly at the peroneal nerves. This is not considered to be truly abnormal.     CLINICAL INTERPRETATION  Her electrodiagnostic findings do not provide explanation for her symptoms. HISTORY OF PRESENT ILLNESS  Lucius Cantu is a 48 y.o. female. Pt presents today for BLE EMG evaluation of BLE paraesthesia (R>L). She has h/o cervical and lumbar pain with intermittent numbness and tingling in her entire face, both hands, and both feet. She also c/o RLE weakness, especially after prolonged sitting. Pt reports that she has had numbness and tingling in her feet for many years. Pt notes that at night, she has significant pain in the lateral right calf. She has h/o blood clots and venous reflux. Pt notes that she attended aquatic PT that exacerbated her pain. She notes that she remains active by walking at least 3 miles per day and maintaining her land with her . Pt reports difficulty sleeping at night. Lumbar MRI 10/24/19: IMPRESSION:  1.  Mild to moderate degenerative disc disease, with slight malalignment at some levels. Degenerative facet arthropathy, severe at L4/5. Baastrup disease.     2. L4/5: Severe facet arthropathy with slight anterolisthesis, moderately narrows the spinal canal. Mild right foramen stenosis. 3. No high-grade canal or foramen stenosis at any other level. PAST MEDICAL HISTORY   Past Medical History:   Diagnosis Date    Endocrine disease     hypothyrodism    Environmental allergies     Hypertension        Past Surgical History:   Procedure Laterality Date    HX APPENDECTOMY      HX GYN      hysterectomy   . MEDICATIONS    Current Outpatient Medications   Medication Sig Dispense Refill    diazePAM (VALIUM) 2 mg tablet Take 1 Tab by mouth daily as needed.  ergocalciferol (ERGOCALCIFEROL) 50,000 unit capsule Take 50,000 Units by mouth every seven (7) days.  ibuprofen (MOTRIN) 800 mg tablet Take 1 Tab by mouth every eight (8) hours as needed.  traMADol (ULTRAM) 50 mg tablet Take 50 mg by mouth every four (4) hours as needed.  lisinopril (PRINIVIL, ZESTRIL) 20 mg tablet Take 1 Tab by mouth daily.  levothyroxine (SYNTHROID) 125 mcg tablet Take 125 mcg by mouth Daily (before breakfast).  FLUoxetine (PROZAC) 20 mg capsule Take 20 mg by mouth daily.  loratadine (CLARITIN) 10 mg tablet Take 1 Tab by mouth daily.           ALLERGIES  Allergies   Allergen Reactions    Acetaminophen Other (comments)    Aspirin Shortness of Breath    Morphine Shortness of Breath    Penicillins Unknown (comments)          SOCIAL HISTORY    Social History     Socioeconomic History    Marital status:      Spouse name: Not on file    Number of children: Not on file    Years of education: Not on file    Highest education level: Not on file   Tobacco Use    Smoking status: Never Smoker    Smokeless tobacco: Never Used   Substance and Sexual Activity    Alcohol use: No    Drug use: No   Other Topics Concern       FAMILY HISTORY  Family History   Problem Relation Age of Onset    No Known Problems Mother     No Known Problems Father          PHYSICAL EXAMINATION  Visit Vitals  /90   Pulse 72   Temp 98.1 °F (36.7 °C) (Oral)   Resp 18   Ht 5' 8\" (1.727 m)   Wt 222 lb (100.7 kg)   BMI 33.75 kg/m²       Pain Assessment  11/4/2019   Location of Pain Neck;Back   Location Modifiers -   Severity of Pain 6   Quality of Pain Aching; Sharl Guarneri; Throbbing;Dull   Quality of Pain Comment -   Duration of Pain Persistent   Frequency of Pain Constant   Limiting Behavior Yes   Result of Injury No           Constitutional:  Well developed, well nourished, in no acute distress. Psychiatric: Affect and mood are appropriate. Integumentary: No rashes or abrasions noted on exposed areas. SPINE/MUSCULOSKELETAL EXAM    On brief examination: Neurologically intact.     MOTOR:      Biceps  Triceps Deltoids Wrist Ext Wrist Flex Hand Intrin   Right 5/5 5/5 5/5 5/5 5/5 5/5   Left 5/5 5/5 5/5 5/5 5/5 5/5             Hip Flex  Quads Hamstrings Ankle DF EHL Ankle PF   Right 5/5 5/5 5/5 5/5 5/5 5/5   Left 5/5 5/5 5/5 5/5 5/5 5/5     NCV & EMG Findings:  Nerve Conduction Studies  Anti Sensory Summary Table     Stim Site NR Peak (ms) Norm Peak (ms) O-P Amp (µV) Norm O-P Amp Site1 Site2 Delta-P (ms) Dist (cm) Tera (m/s) Norm Tera (m/s)   Left Sup Fibular Anti Sensory (Ant Lat Mall)   14 cm    3.7 <4.4 6.0 >5.0 14 cm Ant Lat Mall 3.7 14.0 38 >32   Site 2    3.7  7.2          Right Sup Fibular Anti Sensory (Ant Lat Mall)   14 cm    3.2 <4.4 6.9 >5.0 14 cm Ant Lat Mall 3.2 14.0 44 >32   Site 2    3.3  5.4          Left Sural Anti Sensory (Lat Mall)   Calf    3.2 <4.0 5.9 >5.0 Calf Lat Mall 3.2 14.0 44 >35   Right Sural Anti Sensory (Lat Mall)   Calf    3.8 <4.0 7.1 >5.0 Calf Lat Mall 3.8 14.0 37 >35   Site 2    3.9  3.9            Motor Summary Table     Stim Site NR Onset (ms) Norm Onset (ms) O-P Amp (mV) Norm O-P Amp Site1 Site2 Delta-0 (ms) Dist (cm) Tera (m/s) Norm Tera (m/s)   Left Fibular Motor (Ext Dig Brev)   Ankle    4.5 <6.1 1.1 >2.5 B Fib Ankle 5.9 0.0  >38   B Fib    10.4  2.6  Poplt B Fib 1.2 0.0  >40 Poplt    11.6  2.7          Right Fibular Motor (Ext Dig Brev)   Ankle    5.2 <6.1 1.6 >2.5 B Fib Ankle 5.7 30.5 54 >38   B Fib    10.9  2.8  Poplt B Fib 0.9 6.0 67 >40   Poplt    11.8  3.3          Left Tibial Motor (Abd Rodriguez Brev)   Ankle    4.2 <6.1 4.0 >3.0 Knee Ankle 6.9 37.0 54 >35   Knee    11.1  2.9          Right Tibial Motor (Abd Rodriguez Brev)   Ankle    4.0 <6.1 3.5 >3.0 Knee Ankle 7.7 35.0 45 >35   Knee    11.7  1.2            EMG     Side Muscle Nerve Root Ins Act Fibs Psw Amp Dur Poly Recrt Int Ardith Donath Comment   Left VastusMed Femoral L2-4 Nml Nml Nml Nml Nml 0 Nml Nml    Left AntTibialis Dp Br Fibular L4-5 Nml Nml Nml Nml Nml 0 Nml Nml    Left Gastroc Tibial S1-2 Nml Nml Nml Nml Nml 0 Nml Nml    Right VastusMed Femoral L2-4 Nml Nml Nml Nml Nml 0 Nml Nml    Right AntTibialis Dp Br Fibular L4-5 Nml Nml Nml Nml Nml 0 Nml Nml    Right Gastroc Tibial S1-2 Nml Nml Nml Nml Nml 0 Nml Nml    Left Lumbo Parasp Up Rami L1-2 Nml Nml Nml         Left Lumbo Parasp Mid Rami L3-4 Nml Nml Nml         Left Lumbo Parasp Low Rami L5-S1 Nml Nml Nml         Right Lumbo Parasp Up Rami L1-2 Nml Nml Nml         Right Lumbo Parasp Mid Rami L3-4 Nml Nml Nml         Right Lumbo Parasp Low Rami L5-S1 Nml Nml Nml         Left ExtHallLong Dp Br Fibular L5, S1 Nml Nml Nml Nml Nml 0 Nml Nml    Left PostTibialis Tibial L5, S1 Nml Nml Nml Nml Nml 0 Nml Nml    Right ExtHallLong Dp Br Fibular L5, S1 Nml Nml Nml Nml Nml 0 Nml Nml    Right PostTibialis Tibial L5, S1 Nml Nml Nml Nml Nml 0 Nml Nml        Nerve Conduction Studies  Anti Sensory Left/Right Comparison     Stim Site L Lat (ms) R Lat (ms) L-R Lat (ms) L Amp (µV) R Amp (µV) L-R Amp (%) Site1 Site2 L Tera (m/s) R Tera (m/s) L-R Tera (m/s)   Sup Fibular Anti Sensory (Ant Lat Mall)   14 cm 3.7 3.2 0.5 6.0 6.9 13.0 14 cm Ant Lat Mall 38 44 6   Site 2 3.7 3.3 0.4 7.2 5.4 25.0        Sural Anti Sensory (Lat Mall)   Calf 3.2 3.8 0.6 5.9 7.1 16.9 Calf Lat Mall 44 37 7     Motor Left/Right Comparison     Stim Site L Lat (ms) R Lat (ms) L-R Lat (ms) L Amp (mV) R Amp (mV) L-R Amp (%) Site1 Site2 L Tera (m/s) R Tera (m/s) L-R Tera (m/s)   Fibular Motor (Ext Dig Brev)   Ankle 4.5 5.2 0.7 1.1 1.6 31.3 B Fib Ankle  54    B Fib 10.4 10.9 0.5 2.6 2.8 7.1 Poplt B Fib  67    Poplt 11.6 11.8 0.2 2.7 3.3 18.2        Tibial Motor (Abd Rodriguez Brev)   Ankle 4.2 4.0 0.2 4.0 3.5 12.5 Knee Ankle 54 45 9   Knee 11.1 11.7 0.6 2.9 1.2 58.6              Waveforms:                            [facility]  OFFICE PROCEDURE PROGRESS NOTE        Chart reviewed for the following:   Daphne WORRELL, have reviewed the History, Physical and updated the Allergic reactions for [address] [place] [address] performed immediately prior to start of procedure:   IDaphne, have performed the following reviews [facility] to the start of the procedure:            * Patient was identified by name and date of birth   * Agreement on procedure being performed was verified  * Risks and Benefits explained to the patient  * Procedure site verified and marked as necessary  * Patient was positioned for comfort  * Consent was signed and verified     Time: 3:14 PM      Date of procedure: 12/11/2019    Procedure performed by:  Pelon Kc MD    Provider accompanied by: Shahla. Patient accompanied by: None.     How tolerated by patient: tolerated the procedure well with no complications    Post Procedural Pain Scale: 2 - Hurts Little Bit    Comments: none    Written by Georgena Buerger, [address] as dictated by Daphne Braga MD

## 2019-12-11 NOTE — LETTER
12/11/19 Patient: Pearly Dubin YOB: 1965 Date of Visit: 12/11/2019 Evelio Tejada MD 
[place] [address] [address] [place] [address] VIA Facsimile: [phone] Meredith Daugherty, [address]  [address] [address] VIA In Basket Dear MD Meredith Galvan MD, Thank you for referring Ms. Mikhail Vallecillo to [facility] for evaluation. My notes for this consultation are attached. If you have questions, please do not hesitate to call me. I look forward to following your patient along with you.  
 
 
Sincerely, 
 
Won Fine MD

## 2019-12-16 ENCOUNTER — OFFICE VISIT (OUTPATIENT)
Dept: ORTHOPEDIC SURGERY | Age: 54
End: 2019-12-16

## 2019-12-16 VITALS
RESPIRATION RATE: 16 BRPM | OXYGEN SATURATION: 97 % | SYSTOLIC BLOOD PRESSURE: 127 MMHG | BODY MASS INDEX: 33.95 KG/M2 | DIASTOLIC BLOOD PRESSURE: 85 MMHG | HEART RATE: 78 BPM | HEIGHT: 68 IN | TEMPERATURE: 98 F | WEIGHT: 224 LBS

## 2019-12-16 DIAGNOSIS — M79.2 NEURITIS: ICD-10-CM

## 2019-12-16 DIAGNOSIS — R29.898 RIGHT HAND WEAKNESS: ICD-10-CM

## 2019-12-16 DIAGNOSIS — M79.604 RIGHT LEG PAIN: ICD-10-CM

## 2019-12-16 DIAGNOSIS — R20.0 BILATERAL HAND NUMBNESS: ICD-10-CM

## 2019-12-16 DIAGNOSIS — M79.89 SWELLING OF RIGHT LOWER EXTREMITY: ICD-10-CM

## 2019-12-16 DIAGNOSIS — M47.816 LUMBAR FACET ARTHROPATHY: Primary | ICD-10-CM

## 2019-12-16 DIAGNOSIS — M62.838 MUSCLE SPASM: ICD-10-CM

## 2019-12-16 DIAGNOSIS — M25.571 RIGHT ANKLE PAIN, UNSPECIFIED CHRONICITY: ICD-10-CM

## 2019-12-16 RX ORDER — GABAPENTIN 300 MG/1
CAPSULE ORAL
Qty: 90 CAP | Refills: 1 | Status: SHIPPED | OUTPATIENT
Start: 2019-12-16 | End: 2020-02-10

## 2019-12-16 RX ORDER — TRAMADOL HYDROCHLORIDE 50 MG/1
50 TABLET ORAL
Qty: 28 TAB | Refills: 0 | Status: SHIPPED | OUTPATIENT
Start: 2019-12-16 | End: 2020-02-17 | Stop reason: SDUPTHER

## 2019-12-16 NOTE — PROGRESS NOTES
[facility]  [address], [facility] [address]  Phone: [phone]  Fax: [phone]    Pt's YOB: 1965    ASSESSMENT   Diagnoses and all orders for this visit:    1. Lumbar facet arthropathy    2. Swelling of right lower extremity  -     DUPLEX LOWER EXT VENOUS RIGHT; Future    3. Right hand weakness  -     EMG TWO EXTREMITIES UPPER; Future    4. Bilateral hand numbness  -     EMG TWO EXTREMITIES UPPER; Future    5. Right ankle pain, unspecified chronicity  -     traMADol (ULTRAM) 50 mg tablet; Take 1 Tab by mouth every six (6) hours as needed (Severe Pain) for up to 7 days. Max Daily Amount: 200 mg.    6. Neuritis  -     gabapentin (NEURONTIN) 300 mg capsule; Take 1 cap by mouth in the morning and 2 at night as directed. 7. Muscle spasm    8. Right leg pain         IMPRESSION AND PLAN:  Penelope Rodriguez is a 48 y.o. female with history of cervical and lumbar pain. Pt complains of intermittent numbness and tingling in the her entire face and in the entirety of both hands. She also complains of pain in the lower back, weakness in the right leg, and numbness/tingling in her feet x many years. She has not been followed by a neurologist and reports stomach upset when taking Topamax. 1) Pt was given information on cervical and lumbar arthritis exercises. 2) She was prescribed Neurontin 300 mg 1 tab QAM and 2 tabs QHS tapering up as directed. 3) Pt received a refill of Ultram 50 mg 1 tab QHS prn pain. 4) A bilateral upper extremity EMG was ordered to assess for entrapment and cervical radiculopathy. 5) A right lower extremity doppler study was ordered -- pt has increased swelling in her right leg which she reports has had 2 blood clots previously  6) Ms. Evelyn Azul has a reminder for a \"due or due soon\" health maintenance.  I have asked that she contact her primary care provider, Kendal Stout MD, for follow-up on this health maintenance. 7)  demonstrated consistency with prescribing. 8) Pt has facial numbness, hand numbness, and right leg pain with history of radicular symptoms, discussed at length her various symptoms and recent normal right leg EMG; consider evaluation by neurology pending current testing and treatment. Follow-up and Dispositions    · Return in about 4 weeks (around 1/13/2020) for Medication follow up, Diagnostic Test follow up. HISTORY OF PRESENT ILLNESS:  Cassie Lin is a 48 y.o. female with history of cervical and lumbar pain and presents to the office today for EMG follow up. Pt complains of intermittent numbness and tingling in the her entire face and numbness that extends from the elbows into the entirety of both hands. Her hand numbness is most severe during the day. Pt also complains of pain in the lower back, weakness in the right leg, and numbness/tingling in her feet x many years. She reports a history of 2 blood clots in the right leg in the past and notes that her current sympotms are different from what she experienced with previous blood clots. She reports relief with a bilateral L4-5 facet injection on 11/20/2019 but still has pain in her right lower leg near her ankle. Pt notes severe pain located in the right lower leg when she is laying in bed at night, which interferes with her sleep. She denies any leg pain with ambulation or when standing. She notes that she occasionally stumbles when walking. Pt admits to an episode of dizziness this week and notes that she felt as if she was leaning sideways while standing straight. Pt reports that she has not been followed by a neurologist. She reports stomach upset with Topamax and notes that her PCP, Geoff Romero MD suggested she try pregabalin. Pt denies ever taking Neurontin. She takes Ultram 50 mg as needed for pain with benefit. Pt at this time desires to proceed with referral to neurology and medication evaluation. Pain Scale: 7/10    PCP: Jimenez Beard MD     Past Medical History:   Diagnosis Date    Endocrine disease     hypothyrodism    Environmental allergies     Hypertension         Social History     Socioeconomic History    Marital status:      Spouse name: Not on file    Number of children: Not on file    Years of education: Not on file    Highest education level: Not on file   Occupational History    Not on file   Social Needs    Financial resource strain: Not on file    Food insecurity:     Worry: Not on file     Inability: Not on file    Transportation needs:     Medical: Not on file     Non-medical: Not on file   Tobacco Use    Smoking status: Never Smoker    Smokeless tobacco: Never Used   Substance and Sexual Activity    Alcohol use: No    Drug use: No    Sexual activity: Not on file   Lifestyle    Physical activity:     Days per week: Not on file     Minutes per session: Not on file    Stress: Not on file   Relationships    Social connections:     Talks on phone: Not on file     Gets together: Not on file     Attends Scientologist service: Not on file     Active member of club or organization: Not on file     Attends meetings of clubs or organizations: Not on file     Relationship status: Not on file    Intimate partner violence:     Fear of current or ex partner: Not on file     Emotionally abused: Not on file     Physically abused: Not on file     Forced sexual activity: Not on file   Other Topics Concern     Service Not Asked    Blood Transfusions Not Asked    Caffeine Concern Not Asked    Occupational Exposure Not Asked   Royanne Angela Hazards Not Asked    Sleep Concern Not Asked    Stress Concern Not Asked    Weight Concern Not Asked    Special Diet Not Asked    Back Care Not Asked    Exercise Not Asked    Bike Helmet Not Asked   [address] Not Asked    Self-Exams Not Asked   Social History Narrative    Not on file       Current Outpatient Medications Medication Sig Dispense Refill    gabapentin (NEURONTIN) 300 mg capsule Take 1 cap by mouth in the morning and 2 at night as directed. 90 Cap 1    traMADol (ULTRAM) 50 mg tablet Take 1 Tab by mouth every six (6) hours as needed (Severe Pain) for up to 7 days. Max Daily Amount: 200 mg. 28 Tab 0    ibuprofen (MOTRIN) 800 mg tablet Take 1 Tab by mouth every eight (8) hours as needed.  traMADol (ULTRAM) 50 mg tablet Take 50 mg by mouth every four (4) hours as needed.  lisinopril (PRINIVIL, ZESTRIL) 20 mg tablet Take 1 Tab by mouth daily.  levothyroxine (SYNTHROID) 125 mcg tablet Take 125 mcg by mouth Daily (before breakfast).  FLUoxetine (PROZAC) 20 mg capsule Take 20 mg by mouth daily.  loratadine (CLARITIN) 10 mg tablet Take 1 Tab by mouth daily.  diazePAM (VALIUM) 2 mg tablet Take 1 Tab by mouth daily as needed.  ergocalciferol (ERGOCALCIFEROL) 50,000 unit capsule Take 50,000 Units by mouth every seven (7) days. Allergies   Allergen Reactions    Acetaminophen Other (comments)    Aspirin Shortness of Breath    Morphine Shortness of Breath    Penicillins Unknown (comments)         REVIEW OF SYSTEMS    Constitutional: Negative for fever, chills, or weight change. Respiratory: Negative for cough or shortness of breath. Cardiovascular: Negative for chest pain or palpitations. Gastrointestinal: Negative for acid reflux, change in bowel habits, or constipation. Genitourinary: Negative for dysuria and flank pain. Musculoskeletal: Positive for cervical and lumbar pain. Positive for weakness in the right leg. Skin: Negative for rash. Neurological: Negative for headaches or dizziness. Positive for facial numbness and numbness in the hands. Endo/Heme/Allergies: Negative for increased bruising. Psychiatric/Behavioral: Positive for difficulty with sleep.       PHYSICAL EXAMINATION  Visit Vitals  /85 (BP 1 Location: Left arm, BP Patient Position: Sitting) Pulse 78   Temp 98 °F (36.7 °C) (Oral)   Resp 16   Ht 5' 8\" (1.727 m)   Wt 224 lb (101.6 kg)   SpO2 97%   BMI 34.06 kg/m²       Constitutional: Awake, alert, and in no acute distress. Neurological: 1+ symmetrical DTRs in the upper extremities. 1+ symmetrical DTRs in the lower extremities. Sensation to light touch is intact. Negative Andrade's sign bilaterally. Skin: warm, dry, and intact. Musculoskeletal: Negative Tinel's test bilaterally. Negative Phalen's test bilaterally. Tenderness to palpation in the lower lumbar region. Moderate pain with extension, axial loading, and forward flexion. No pain with internal or external rotation of her hips. Negative straight leg raise bilaterally      Biceps  Triceps Deltoids Wrist Ext Wrist Flex Hand Intrin   Right +4/5 +4/5 +4/5 +4/5 +4/5 +4/5   Left +4/5 +4/5 +4/5 +4/5 +4/5 +4/5      Hip Flex  Quads Hamstrings Ankle DF EHL Ankle PF   Right +4/5 +4/5 +4/5 -3/5 +4/5 +4/5   Left +4/5 +4/5 +4/5 +4/5 +4/5 +4/5     IMAGING:    Cervical spine MRI from 10/23/2019 was personally reviewed with the patient and demonstrated:  IMPRESSION:  1. Mild to moderate cervical degenerative disc disease and moderate to severe facet arthropathy. 2. C4/5: Mild canal stenosis and cord deformity. Severe left foramen stenosis. 3. No high-grade canal or foramen stenosis at any other level.     Lumbar spine MRI from 10/23/2019 was personally reviewed with the patient and demonstrated:  IMPRESSION:  1. Mild to moderate degenerative disc disease, with slight malalignment at some levels. degenerative facet arthropathy, severe at L4/5. Baastrup disease. 2. L4/5: Severe facet arthropathy with slight anterolisthesis, moderately narrows the spinal canal. Mild right foramen stenosis. 3. No high-grade canal or foramen stenosis at any other level. Bilateral lower extremity EMG from 12/11/2019 was personally reviewed with the patient and demonstrated:  NCV & EMG Findings:   All examined muscles (as indicated in the following table) showed no evidence of electrical instability.      INTERPRETATION  This was a normal nerve conduction and EMG study showing there to be no signs of neuropathy, myopathy, or radiculopathy in the nerves and muscles tested. Written by Luisa Caldwell, as dictated by Nimisha Parsons MD.  I, Dr. Nimisha Parsons confirm that all documentation is accurate.

## 2019-12-16 NOTE — PATIENT INSTRUCTIONS

## 2019-12-16 NOTE — LETTER
12/18/19 Patient: Srinath Padgett YOB: 1965 Date of Visit: 12/16/2019 Fredrick Carrera MD 
[address] [place] [address] VIA Facsimile: [phone] Dear Fredrick Carrera MD, Thank you for referring Ms. Vitaliy Pitts to [facility] for evaluation. My notes for this consultation are attached. If you have questions, please do not hesitate to call me. I look forward to following your patient along with you. Sincerely, Pb Wall MD

## 2019-12-20 ENCOUNTER — DOCUMENTATION ONLY (OUTPATIENT)
Dept: ORTHOPEDIC SURGERY | Age: 54
End: 2019-12-20

## 2019-12-20 NOTE — PROGRESS NOTES
Order and office notes faxed to [facility] Scheduling to have them set up Venous Study for Right Lower Ext @ [facility] and to notify the patient of date. They will authorize with the insurance if needed. Patient aware.

## 2020-01-31 ENCOUNTER — OFFICE VISIT (OUTPATIENT)
Dept: ORTHOPEDIC SURGERY | Age: 55
End: 2020-01-31

## 2020-01-31 VITALS
HEIGHT: 68 IN | DIASTOLIC BLOOD PRESSURE: 76 MMHG | TEMPERATURE: 98 F | HEART RATE: 70 BPM | BODY MASS INDEX: 33.71 KG/M2 | WEIGHT: 222.4 LBS | RESPIRATION RATE: 20 BRPM | SYSTOLIC BLOOD PRESSURE: 120 MMHG | OXYGEN SATURATION: 98 %

## 2020-01-31 DIAGNOSIS — R20.0 NUMBNESS AND TINGLING IN BOTH HANDS: Primary | ICD-10-CM

## 2020-01-31 DIAGNOSIS — R20.2 NUMBNESS AND TINGLING IN BOTH HANDS: Primary | ICD-10-CM

## 2020-01-31 NOTE — PROGRESS NOTES
[address]  [address]  [address]  Phone: [phone]  Fax: [phone]        Tonda Gottron  : 1965  PCP: Chandan Fu MD  2020    ELECTROMYOGRAPHY AND NERVE CONDUCTION STUDIES    Tiffani Pope was referred by Dr. Sujata Johnson for electrodiagnostic evaluation of bilateral hand numbness and tingling. NCV & EMG Findings:  All nerve conduction studies (as indicated in the following tables) were within normal limits. All left vs. right side differences were within normal limits. All examined muscles (as indicated in the following table) showed no evidence of electrical instability. INTERPRETATION  This was a normal nerve conduction and EMG study showing there to be no signs of neuropathy, myopathy, or radiculopathy in the nerves and muscles tested. CLINICAL INTERPRETATION  Her electrodiagnostic findings do not appear to explain her symptoms. HISTORY OF PRESENT ILLNESS  Tiffani Pope is a 48 y.o. female. Pt presents today for BUE EMG evaluation of bilateral hand numbness and tingling (L>R) with weakness in the LUE. She reports difficulty opening things with her left hand. Pt notes that she has experienced swelling in her hands and discoloration. She has been unable to wear her rings. Pt reports that over the last week, she has begun experiencing tremors in both hands. Pt notes that she also experiences tingling in her face. Pt notes that Gabapentin has improved her LE symptoms and she is now able to sleep better. She previously underwent a BLE EMG that was benign. PAST MEDICAL HISTORY   Past Medical History:   Diagnosis Date    Endocrine disease     hypothyrodism    Environmental allergies     Hypertension        Past Surgical History:   Procedure Laterality Date    HX APPENDECTOMY      HX GYN      hysterectomy   .       MEDICATIONS    Current Outpatient Medications   Medication Sig Dispense Refill    gabapentin (NEURONTIN) 300 mg capsule Take 1 cap by mouth in the morning and 2 at night as directed. 90 Cap 1    diazePAM (VALIUM) 2 mg tablet Take 1 Tab by mouth daily as needed.  ergocalciferol (ERGOCALCIFEROL) 50,000 unit capsule Take 50,000 Units by mouth every seven (7) days.  ibuprofen (MOTRIN) 800 mg tablet Take 1 Tab by mouth every eight (8) hours as needed.  traMADol (ULTRAM) 50 mg tablet Take 50 mg by mouth every four (4) hours as needed.  lisinopril (PRINIVIL, ZESTRIL) 20 mg tablet Take 1 Tab by mouth daily.  levothyroxine (SYNTHROID) 125 mcg tablet Take 125 mcg by mouth Daily (before breakfast).  FLUoxetine (PROZAC) 20 mg capsule Take 20 mg by mouth daily.  loratadine (CLARITIN) 10 mg tablet Take 1 Tab by mouth daily.           ALLERGIES  Allergies   Allergen Reactions    Acetaminophen Other (comments)    Aspirin Shortness of Breath    Morphine Shortness of Breath    Penicillins Unknown (comments)          SOCIAL HISTORY    Social History     Socioeconomic History    Marital status:      Spouse name: Not on file    Number of children: Not on file    Years of education: Not on file    Highest education level: Not on file   Tobacco Use    Smoking status: Never Smoker    Smokeless tobacco: Never Used   Substance and Sexual Activity    Alcohol use: No    Drug use: No   Other Topics Concern       FAMILY HISTORY  Family History   Problem Relation Age of Onset    No Known Problems Mother     No Known Problems Father          PHYSICAL EXAMINATION  Visit Vitals  /76 (BP 1 Location: Right arm, BP Patient Position: Sitting)   Pulse 70   Temp 98 °F (36.7 °C) (Oral)   Resp 20   Ht 5' 8\" (1.727 m)   Wt 222 lb 6.4 oz (100.9 kg)   SpO2 98%   BMI 33.82 kg/m²       Pain Assessment  1/31/2020   Location of Pain Arm   Pain Location Comment -   Location Modifiers -   Severity of Pain -   Quality of Pain (No Data)   Quality of Pain Comment \"numbness, weakness, tingling in left hand\"   Duration of Pain Persistent   Frequency of Pain Constant   Aggravating Factors -   Aggravating Factors Comment -   Limiting Behavior -   Relieving Factors -   Relieving Factors Comment -   Result of Injury No           Constitutional:  Well developed, well nourished, in no acute distress. Psychiatric: Affect and mood are appropriate. Integumentary: No rashes or abrasions noted on exposed areas. SPINE/MUSCULOSKELETAL EXAM    On brief examination: Allodynia of hands.       NCV & EMG Findings:  Nerve Conduction Studies  Anti Sensory Summary Table     Stim Site NR Peak (ms) Norm Peak (ms) O-P Amp (µV) Norm O-P Amp Site1 Site2 Delta-P (ms) Dist (cm) Tera (m/s) Norm Tera (m/s)   Left Median Anti Sensory (2nd Digit)   Wrist    3.3 <3.6 112.0 >10 Wrist 2nd Digit 3.3 14.0 42 >39   Right Median Anti Sensory (2nd Digit)   Wrist    3.4 <3.6 110.0 >10 Wrist 2nd Digit 3.4 14.0 41 >39   Left Radial Anti Sensory (Base 1st Digit)   Wrist    2.0 <3.1 27.9  Wrist Base 1st Digit 2.0 0.0     Right Radial Anti Sensory (Base 1st Digit)   Wrist    2.0 <3.1 41.2  Wrist Base 1st Digit 2.0 0.0     Left Ulnar Anti Sensory (5th Digit)   Wrist    3.5 <3.7 83.9 >15.0 Wrist 5th Digit 3.5 14.0 40 >38   Right Ulnar Anti Sensory (5th Digit)   Wrist    3.4 <3.7 75.4 >15.0 Wrist 5th Digit 3.4 14.0 41 >38     Motor Summary Table     Stim Site NR Onset (ms) Norm Onset (ms) O-P Amp (mV) Norm O-P Amp Site1 Site2 Delta-0 (ms) Dist (cm) Tera (m/s) Norm Tera (m/s)   Left Median Motor (Abd Poll Brev)   Wrist    3.4 <4.2 7.0 >5 Elbow Wrist 4.2 22.5 54 >50   Elbow    7.6  6.5          Right Median Motor (Abd Poll Brev)   Wrist    3.3 <4.2 11.6 >5 Elbow Wrist 4.1 23.5 57 >50   Elbow    7.4  11.5          Left Ulnar Motor (Abd Dig Min)   Wrist    2.9 <4.2 13.1 >3 B Elbow Wrist 3.2 19.0 59 >53   B Elbow    6.1  12.5  A Elbow B Elbow 1.6 10.0 62 >53   A Elbow    7.7  12.8          Right Ulnar Motor (Abd Dig Min) Wrist    2.8 <4.2 12.8 >3 B Elbow Wrist 3.4 20.0 59 >53   B Elbow    6.2  11.4  A Elbow B Elbow 1.5 10.0 67 >53   A Elbow    7.7  11.8            EMG     Side Muscle Nerve Root Ins Act Fibs Psw Amp Dur Poly Recrt Int [facility] Comment   Right Deltoid Axillary C5-6 Nml Nml Nml Nml Nml 0 Nml Nml    Right Biceps Musculocut C5-6 Nml Nml Nml Nml Nml 0 Nml Nml    Right Triceps Radial C6-7-8 Nml Nml Nml Nml Nml 0 Nml Nml    Right PronatorTeres Median C6-7 Nml Nml Nml Nml Nml 0 Nml Nml    Right 1stDorInt Ulnar C8-T1 Nml Nml Nml Nml Nml 0 Nml Nml    Left Biceps Musculocut C5-6 Nml Nml Nml Nml Nml 0 Nml Nml    Left Triceps Radial C6-7-8 Nml Nml Nml Nml Nml 0 Nml Nml    Left PronatorTeres Median C6-7 Nml Nml Nml Nml Nml 0 Nml Nml    Left 1stDorInt Ulnar C8-T1 Nml Nml Nml Nml Nml 0 Nml Nml    Right Cervical Parasp Up Rami C1-3 Nml Nml Nml         Right Cervical Parasp Mid Rami C4-6 Nml Nml Nml         Right Cervical Parasp Low Rami C7-8 Nml Nml Nml         Left Cervical Parasp Up Rami C1-3 Nml Nml Nml         Left Cervical Parasp Mid Rami C4-6 Nml Nml Nml         Left Cervical Parasp Low Rami C7-8 Nml Nml Nml         Left Deltoid Axillary C5-6 Nml Nml Nml Nml Nml 0 Nml Nml        Nerve Conduction Studies  Anti Sensory Left/Right Comparison     Stim Site L Lat (ms) R Lat (ms) L-R Lat (ms) L Amp (µV) R Amp (µV) L-R Amp (%) Site1 Site2 L Tera (m/s) R Tera (m/s) L-R Tera (m/s)   Median Anti Sensory (2nd Digit)   Wrist 3.3 3.4 0.1 112.0 110.0 1.8 Wrist 2nd Digit 42 41 1   Radial Anti Sensory (Base 1st Digit)   Wrist 2.0 2.0 0.0 27.9 41.2 32.3 Wrist Base 1st Digit      Ulnar Anti Sensory (5th Digit)   Wrist 3.5 3.4 0.1 83.9 75.4 10.1 Wrist 5th Digit 40 41 1     Motor Left/Right Comparison     Stim Site L Lat (ms) R Lat (ms) L-R Lat (ms) L Amp (mV) R Amp (mV) L-R Amp (%) Site1 Site2 L Tera (m/s) R Tera (m/s) L-R Tera (m/s)   Median Motor (Abd Poll Brev)   Wrist 3.4 3.3 0.1 7.0 11.6 39.7 Elbow Wrist 54 57 3   Elbow 7.6 7.4 0.2 6.5 11.5 43.5 Ulnar Motor (Abd Dig Min)   Wrist 2.9 2.8 0.1 13.1 12.8 2.3 B Elbow Wrist 59 59 0   B Elbow 6.1 6.2 0.1 12.5 11.4 8.8 A Elbow B Elbow 62 67 5   A Elbow 7.7 7.7 0.0 12.8 11.8 7.8              Waveforms:                                  [facility]  OFFICE PROCEDURE PROGRESS NOTE        Chart reviewed for the following:   Francesca WORRELL, have reviewed the History, Physical and updated the Allergic reactions for [address] [place] [address] performed immediately prior to start of procedure:   Francesca WORRELL, have performed the following reviews on [facility] prior to the start of the procedure:            * Patient was identified by name and date of birth   * Agreement on procedure being performed was verified  * Risks and Benefits explained to the patient  * Procedure site verified and marked as necessary  * Patient was positioned for comfort  * Consent was signed and verified     Time: 1:39 PM      Date of procedure: 1/31/2020    Procedure performed by:  Chadwick Camara MD    Provider accompanied by: Scribe. Patient accompanied by: Self.     How tolerated by patient: tolerated the procedure well with no complications    Post Procedural Pain Scale: 2 - Hurts Little Bit    Comments: none    Written by Selena Gunter, [address] as dictated by Francesca Sherman MD

## 2020-01-31 NOTE — LETTER
1/31/20 Patient: Ernestina Kemp YOB: 1965 Date of Visit: 1/31/2020 MD Ken[facility]sMobile [facility] HospitalleticiaPatrick [facility] [address] [address] [place] [address] VIA Facsimile: [phone] Juan Carlos Velasquez, [address]  [address] [facility] [address] VIA In Basket Dear MD Juan Carlos Walls MD, Thank you for referring Ms. Ari Dias to [facility] for evaluation. My notes for this consultation are attached. If you have questions, please do not hesitate to call me. I look forward to following your patient along with you.  
 
 
Sincerely, 
 
Masha Shah MD

## 2020-02-10 DIAGNOSIS — M79.2 NEURITIS: ICD-10-CM

## 2020-02-10 RX ORDER — GABAPENTIN 300 MG/1
CAPSULE ORAL
Qty: 90 CAP | Refills: 0 | Status: SHIPPED | OUTPATIENT
Start: 2020-02-10 | End: 2020-03-31 | Stop reason: SDUPTHER

## 2020-02-13 NOTE — PROGRESS NOTES
[facility]  [address] [place] [address], [facility] [address]  Phone: [phone]  Fax: [phone]    Pt's YOB: 1965    ASSESSMENT   Diagnoses and all orders for this visit:    1. Neuritis  -     gabapentin (NEURONTIN) 300 mg capsule; Take 2 Caps by mouth two (2) times a day. Max Daily Amount: 1,200 mg.    2. Right leg weakness    3. Lumbar facet arthropathy  -     traMADol (ULTRAM) 50 mg tablet; Take 1 Tab by mouth every eight (8) hours as needed (Severe Pain) for up to 7 days. Max Daily Amount: 150 mg.    4. Lumbar pain  -     traMADol (ULTRAM) 50 mg tablet; Take 1 Tab by mouth every eight (8) hours as needed (Severe Pain) for up to 7 days. Max Daily Amount: 150 mg.    5. Spinal stenosis of lumbar region without neurogenic claudication    6. Bilateral hand numbness         IMPRESSION AND PLAN:  Connor Lofton is a 48 y.o. right hand dominant female with history of cervical and lumbar pain. Pt notes episodes of swelling in the hands and notes that this generally improves over a span of 4 hours. She also complains of intermittent tremors in the hands and facial numbness/tingling. Pt takes Ultram 50 mg intermittently as her pain warrants and 300 mg 1 cap QAM and 2 caps QHS with improvement in her sleep and left leg pain. 1) Pt was given information on lumbar arthritis exercises. 2) She received a refill of Neurontin 300 mg 2 caps QAM and 2 caps QHS-- will increase to help with neuropathic symptoms   3) Pt received a refill of Ultram 50 mg 1 tab Q8 hours prn pain. 4) Discussed referral to neurology-- would recommend this for further evaluation of her diffuse neuropathic symptoms which are not explained by her MRI and EMG/NCS. Candy Russell 3) Ms. Yessenia Velarde has a reminder for a \"due or due soon\" health maintenance. I have asked that she contact her primary care provider, Kendra Stephenson MD, for follow-up on this health maintenance.   4)  demonstrated consistency with prescribing. Follow-up and Dispositions    · Return in about 4 weeks (around 3/16/2020) for Medication follow up. HISTORY OF PRESENT ILLNESS:  Dwight Beltran is a 48 y.o. right hand dominant female with history of cervical and lumbar pain and presents to the office today for EMG follow up. Pt notes episodes of swelling in the hands and notes that this generally improves over a span of 4 hours. She notes that she had been at work since 7 AM when she started to noticed swelling in the hands. Pt also complains of intermittent tremors in the hands. She notes that about 1 week ago when she was walking to work she experienced numbness in the left leg which caused her to stumble. Pt also notes intermittent facial numbness/tingling. She denies previously following up with neurology. Pt notes that she followed up with rheumatology, Dr. Mallorie Li, in 03/2019. She reports that the right lower extremity venous study ordered at her last office visit was never scheduled. Pt notes a history of 2 superficial blood clots in the right leg. She takes Ultram 50 mg intermittently as her pain warrants and notes that she last received a refill of from her PCP, Nayely Leyva MD. Pt is due to follow up with Dr. Deandra Mcgrath on Wednesday, 02/19/2020. She started Neurontin 300 mg 1 cap QAM and 2 caps QHS in mid 12/2019 and notes that she sleeps better while on the medication. Pt also notes improvement in her left leg pain since starting the Neurontin. She denies any worsening tremors in the hands since starting the Neurontin. Pt also denies any dizziness or floatiness with the Neurontin. She denies ever taking Lyrica. Multiple labs reviewed in Care Everywhere and recent thyroid studies were normal and CBC also was normal.  Labs from 03/13/2019 demonstrated ROSANNA speckled pattern of 1:640 and RNP of 1.7 which was elevated. All other labs reviewed were within the normal range.   Pt at this time desires to continue with current care. 30 minutes of face to face contact was spent with the patient during today's office visit extensively discussing her symptoms, medication, previous evaluation by rheumatology, labs, recent EMG/NCS  and treatment plan.       Pain Scale: 9/10    PCP: Sandi Morrison MD     Past Medical History:   Diagnosis Date    Endocrine disease     hypothyrodism    Environmental allergies     Hypertension         Social History     Socioeconomic History    Marital status:      Spouse name: Not on file    Number of children: Not on file    Years of education: Not on file    Highest education level: Not on file   Occupational History    Not on file   Social Needs    Financial resource strain: Not on file    Food insecurity:     Worry: Not on file     Inability: Not on file    Transportation needs:     Medical: Not on file     Non-medical: Not on file   Tobacco Use    Smoking status: Never Smoker    Smokeless tobacco: Never Used   Substance and Sexual Activity    Alcohol use: No    Drug use: No    Sexual activity: Not on file   Lifestyle    Physical activity:     Days per week: Not on file     Minutes per session: Not on file    Stress: Not on file   Relationships    Social connections:     Talks on phone: Not on file     Gets together: Not on file     Attends Jew service: Not on file     Active member of club or organization: Not on file     Attends meetings of clubs or organizations: Not on file     Relationship status: Not on file    Intimate partner violence:     Fear of current or ex partner: Not on file     Emotionally abused: Not on file     Physically abused: Not on file     Forced sexual activity: Not on file   Other Topics Concern     Service Not Asked    Blood Transfusions Not Asked    Caffeine Concern Not Asked    Occupational Exposure Not Asked   [place] [employer] Not Asked    Sleep Concern Not Asked    Stress Concern Not Asked    Weight Concern Not Asked    Special Diet Not Asked    Back Care Not Asked    Exercise Not Asked    Bike Helmet Not Asked   [address] [facility] [facility] Not Asked    Self-Exams Not Asked   Social History Narrative    Not on file       Current Outpatient Medications   Medication Sig Dispense Refill    traMADol (ULTRAM) 50 mg tablet Take 1 Tab by mouth every eight (8) hours as needed (Severe Pain) for up to 7 days. Max Daily Amount: 150 mg. 21 Tab 0    gabapentin (NEURONTIN) 300 mg capsule Take 2 Caps by mouth two (2) times a day. Max Daily Amount: 1,200 mg. 120 Cap 1    gabapentin (NEURONTIN) 300 mg capsule TAKE ONE CAPSULE BY MOUTH EVERY MORNING AND TAKE TWO CAPSULES BY MOUTH EVERY EVENING AS DIRECTED (Patient taking differently: Take 300 mg by mouth two (2) times a day. TAKE ONE CAPSULE BY MOUTH EVERY MORNING AND TAKE TWO CAPSULES BY MOUTH EVERY EVENING AS DIRECTED) 90 Cap 0    ibuprofen (MOTRIN) 800 mg tablet Take 1 Tab by mouth every eight (8) hours as needed.  lisinopril (PRINIVIL, ZESTRIL) 20 mg tablet Take 1 Tab by mouth daily.  levothyroxine (SYNTHROID) 125 mcg tablet Take 125 mcg by mouth Daily (before breakfast).  loratadine (CLARITIN) 10 mg tablet Take 1 Tab by mouth daily.  diazePAM (VALIUM) 2 mg tablet Take 1 Tab by mouth daily as needed.  ergocalciferol (ERGOCALCIFEROL) 50,000 unit capsule Take 50,000 Units by mouth every seven (7) days.  traMADol (ULTRAM) 50 mg tablet Take 50 mg by mouth every four (4) hours as needed.  FLUoxetine (PROZAC) 20 mg capsule Take 20 mg by mouth daily. Allergies   Allergen Reactions    Acetaminophen Other (comments)    Aspirin Shortness of Breath    Morphine Shortness of Breath    Penicillins Unknown (comments)         REVIEW OF SYSTEMS    Constitutional: Negative for fever, chills, or weight change. Respiratory: Negative for cough or shortness of breath. Cardiovascular: Negative for chest pain or palpitations.   Gastrointestinal: Negative for acid reflux, change in bowel habits, or constipation. Genitourinary: Negative for dysuria and flank pain. Musculoskeletal: Positive for lumbar pain and right leg weakness. Skin: Negative for rash. Neurological: Negative for headaches, dizziness. Positive for facial numbness and numbness in the hands. Endo/Heme/Allergies: Negative for increased bruising. Psychiatric/Behavioral: Negative for difficulty with sleep. PHYSICAL EXAMINATION  Visit Vitals  /73 (BP 1 Location: Right arm, BP Patient Position: Sitting)   Pulse 89   Temp 97.8 °F (36.6 °C) (Oral)   Ht 5' 8\" (1.727 m)   Wt 201 lb 12.8 oz (91.5 kg)   SpO2 96%   BMI 30.68 kg/m²       Constitutional: Awake, alert, and in no acute distress. Neurological: 1+ symmetrical DTRs in the upper extremities. 1+ symmetrical DTRs in the lower extremities. Sensation to light touch is intact. Negative Andrade's sign bilaterally. Skin: warm, dry, and intact. Musculoskeletal: Tenderness to palpation in the lower lumbar region. Moderate pain with extension and axial loading. No pain with internal or external rotation of her hips. Negative straight leg raise bilaterally. Biceps  Triceps Deltoids Wrist Ext Wrist Flex Hand Intrin   Right +4/5 +4/5 +4/5 +4/5 +4/5 +4/5   Left +4/5 +4/5 +4/5 +4/5 +4/5 +4/5       Hip Flex  Quads   Hamstrings Ankle DF EHL Ankle PF   Right +4/5 +4/5   +4/5 -3/5 +4/5 +4/5   Left +4/5 +4/5   +4/5 +4/5 +4/5 +4/5     IMAGING:    Cervical spine MRI from 10/23/2019 was personally reviewed with the patient and demonstrated:  IMPRESSION:  1. Mild to moderate cervical degenerative disc disease and moderate to severe facet arthropathy. 2. C4/5: Mild canal stenosis and cord deformity. Severe left foramen stenosis. 3. No high-grade canal or foramen stenosis at any other level.     Lumbar spine MRI from 10/23/2019 was personally reviewed with the patient and demonstrated:  IMPRESSION:  1.  Mild to moderate degenerative disc disease, with slight malalignment at some levels. degenerative facet arthropathy, severe at L4/5. [place] disease. 2. L4/5: Severe facet arthropathy with slight anterolisthesis, moderately narrows the spinal canal. Mild right foramen stenosis. 3. No high-grade canal or foramen stenosis at any other level.        Bilateral upper extremity NCV & EMG from 01/31/2020 was personally reviewed with the patient and demonstrated: All nerve conduction studies (as indicated in the following tables) were within normal limits. All left vs. right side differences were within normal limits. All examined muscles (as indicated in the following table) showed no evidence of electrical instability.       INTERPRETATION  This was a normal nerve conduction and EMG study showing there to be no signs of neuropathy, myopathy, or radiculopathy in the nerves and muscles tested.      CLINICAL INTERPRETATION  Her electrodiagnostic findings do not appear to explain her symptoms. EMG/NCS of Lower Extremities from 12/11/2019 was reviewed with the patient and demonstrated:  12/11/2019     ELECTROMYOGRAPHY AND NERVE CONDUCTION STUDIES     Clement Sosa was referred by Dr. Zaira Vivas for electrodiagnostic evaluation of BLE paraesthesia.     NCV & EMG Findings: All examined muscles (as indicated in the following table) showed no evidence of electrical instability.       INTERPRETATION  This was a normal nerve conduction and EMG study showing there to be no signs of neuropathy, myopathy, or radiculopathy in the nerves and muscles tested.      TECHNICAL LIMITATIONS  Soft tissue at points of stimulation resulted in distally low amplitudes, particularly at the peroneal nerves.  This is not considered to be truly abnormal.      CLINICAL INTERPRETATION  Her electrodiagnostic findings do not provide explanation for her symptoms.      Written by Jason Hogan, as dictated by May Richardson MD.  I, Dr. May Richardson confirm that all documentation is accurate.

## 2020-02-17 ENCOUNTER — OFFICE VISIT (OUTPATIENT)
Dept: ORTHOPEDIC SURGERY | Age: 55
End: 2020-02-17

## 2020-02-17 VITALS
HEIGHT: 68 IN | SYSTOLIC BLOOD PRESSURE: 112 MMHG | TEMPERATURE: 97.8 F | HEART RATE: 89 BPM | BODY MASS INDEX: 30.58 KG/M2 | OXYGEN SATURATION: 96 % | WEIGHT: 201.8 LBS | DIASTOLIC BLOOD PRESSURE: 73 MMHG

## 2020-02-17 DIAGNOSIS — M54.50 LUMBAR PAIN: ICD-10-CM

## 2020-02-17 DIAGNOSIS — M79.2 NEURITIS: Primary | ICD-10-CM

## 2020-02-17 DIAGNOSIS — R20.0 BILATERAL HAND NUMBNESS: ICD-10-CM

## 2020-02-17 DIAGNOSIS — M48.061 SPINAL STENOSIS OF LUMBAR REGION WITHOUT NEUROGENIC CLAUDICATION: ICD-10-CM

## 2020-02-17 DIAGNOSIS — R29.898 RIGHT LEG WEAKNESS: ICD-10-CM

## 2020-02-17 DIAGNOSIS — M47.816 LUMBAR FACET ARTHROPATHY: ICD-10-CM

## 2020-02-17 RX ORDER — GABAPENTIN 300 MG/1
600 CAPSULE ORAL 2 TIMES DAILY
Qty: 120 CAP | Refills: 1 | Status: SHIPPED | OUTPATIENT
Start: 2020-02-17 | End: 2020-03-31 | Stop reason: SDUPTHER

## 2020-02-17 RX ORDER — TRAMADOL HYDROCHLORIDE 50 MG/1
50 TABLET ORAL
Qty: 21 TAB | Refills: 0 | Status: SHIPPED | OUTPATIENT
Start: 2020-02-17 | End: 2020-02-24

## 2020-02-17 NOTE — PATIENT INSTRUCTIONS
Low Back Arthritis: Exercises  Introduction  Here are some examples of typical rehabilitation exercises for your condition. Start each exercise slowly. Ease off the exercise if you start to have pain. Your doctor or physical therapist will tell you when you can start these exercises and which ones will work best for you. When you are not being active, find a comfortable position for rest. Some people are comfortable on the floor or a medium-firm bed with a small pillow under their head and another under their knees. Some people prefer to lie on their side with a pillow between their knees. Don't stay in one position for too long. Take short walks (10 to 20 minutes) every 2 to 3 hours. Avoid slopes, hills, and stairs until you feel better. Walk only distances you can manage without pain, especially leg pain. How to do the exercises  Pelvic tilt    1. Lie on your back with your knees bent. 2. \"Brace\" your stomach--tighten your muscles by pulling in and imagining your belly button moving toward your spine. 3. Press your lower back into the floor. You should feel your hips and pelvis rock back. 4. Hold for 6 seconds while breathing smoothly. 5. Relax and allow your pelvis and hips to rock forward. 6. Repeat 8 to 12 times. Back stretches    1. Get down on your hands and knees on the floor. 2. Relax your head and allow it to droop. Round your back up toward the ceiling until you feel a nice stretch in your upper, middle, and lower back. Hold this stretch for as long as it feels comfortable, or about 15 to 30 seconds. 3. Return to the starting position with a flat back while you are on your hands and knees. 4. Let your back sway by pressing your stomach toward the floor. Lift your buttocks toward the ceiling. 5. Hold this position for 15 to 30 seconds. 6. Repeat 2 to 4 times. Follow-up care is a key part of your treatment and safety.  Be sure to make and go to all appointments, and call your doctor if you are having problems. It's also a good idea to know your test results and keep a list of the medicines you take. Where can you learn more? Go to http://rei-ayan.info/. Enter C114 in the search box to learn more about \"Low Back Arthritis: Exercises. \"  Current as of: June 26, 2019  Content Version: 12.2  © 8590-8980 [employer]. Care instructions adapted under license by "[employer] (which disclaims liability or warranty for this information). If you have questions about a medical condition or this instruction, always ask your healthcare professional. [address] any warranty or liability for your use of this information.

## 2020-02-17 NOTE — LETTER
2/18/20 Patient: Hussein Christiansen YOB: 1965 Date of Visit: 2/17/2020 Marichuy Lambert MD 
[address] [facility] [address] [place] [address] VIA Facsimile: [phone] Dear Marichuy Lambert MD, Thank you for referring Ms. Nguyen Godwin to [facility] for evaluation. My notes for this consultation are attached. If you have questions, please do not hesitate to call me. I look forward to following your patient along with you. Sincerely, Marcela Granger MD

## 2020-03-07 DIAGNOSIS — M79.2 NEURITIS: ICD-10-CM

## 2020-03-09 RX ORDER — GABAPENTIN 300 MG/1
CAPSULE ORAL
Qty: 90 CAP | Refills: 0 | OUTPATIENT
Start: 2020-03-09

## 2020-03-31 ENCOUNTER — VIRTUAL VISIT (OUTPATIENT)
Dept: ORTHOPEDIC SURGERY | Age: 55
End: 2020-03-31

## 2020-03-31 DIAGNOSIS — M48.061 SPINAL STENOSIS OF LUMBAR REGION WITHOUT NEUROGENIC CLAUDICATION: ICD-10-CM

## 2020-03-31 DIAGNOSIS — R20.0 BILATERAL HAND NUMBNESS: ICD-10-CM

## 2020-03-31 DIAGNOSIS — M54.50 LUMBAR PAIN: ICD-10-CM

## 2020-03-31 DIAGNOSIS — M47.816 LUMBAR FACET ARTHROPATHY: Primary | ICD-10-CM

## 2020-03-31 DIAGNOSIS — M79.2 NEURITIS: ICD-10-CM

## 2020-03-31 RX ORDER — TRAMADOL HYDROCHLORIDE 50 MG/1
50 TABLET ORAL
Qty: 28 TAB | Refills: 0 | Status: SHIPPED | OUTPATIENT
Start: 2020-03-31 | End: 2020-04-07

## 2020-03-31 RX ORDER — GABAPENTIN 300 MG/1
600 CAPSULE ORAL 2 TIMES DAILY
Qty: 360 CAP | Refills: 1 | Status: SHIPPED | OUTPATIENT
Start: 2020-03-31

## 2020-03-31 NOTE — PATIENT INSTRUCTIONS
Low Back Arthritis: Exercises Introduction Here are some examples of typical rehabilitation exercises for your condition. Start each exercise slowly. Ease off the exercise if you start to have pain. Your doctor or physical therapist will tell you when you can start these exercises and which ones will work best for you. When you are not being active, find a comfortable position for rest. Some people are comfortable on the floor or a medium-firm bed with a small pillow under their head and another under their knees. Some people prefer to lie on their side with a pillow between their knees. Don't stay in one position for too long. Take short walks (10 to 20 minutes) every 2 to 3 hours. Avoid slopes, hills, and stairs until you feel better. Walk only distances you can manage without pain, especially leg pain. How to do the exercises Pelvic tilt 1. Lie on your back with your knees bent. 2. \"Brace\" your stomachtighten your muscles by pulling in and imagining your belly button moving toward your spine. 3. Press your lower back into the floor. You should feel your hips and pelvis rock back. 4. Hold for 6 seconds while breathing smoothly. 5. Relax and allow your pelvis and hips to rock forward. 6. Repeat 8 to 12 times. Back stretches 1. Get down on your hands and knees on the floor. 2. Relax your head and allow it to droop. Round your back up toward the ceiling until you feel a nice stretch in your upper, middle, and lower back. Hold this stretch for as long as it feels comfortable, or about 15 to 30 seconds. 3. Return to the starting position with a flat back while you are on your hands and knees. 4. Let your back sway by pressing your stomach toward the floor. Lift your buttocks toward the ceiling. 5. Hold this position for 15 to 30 seconds. 6. Repeat 2 to 4 times. Follow-up care is a key part of your treatment and safety.  Be sure to make and go to all appointments, and call your doctor if you are having problems. It's also a good idea to know your test results and keep a list of the medicines you take. Where can you learn more? Go to http://rei-ayan.info/ Enter E033 in the search box to learn more about \"Low Back Arthritis: Exercises. \" Current as of: June 26, 2019Content Version: 12.4 © 4297-0344 [employer]. Care instructions adapted under license by [employer] (which disclaims liability or warranty for this information). If you have questions about a medical condition or this instruction, always ask your healthcare professional. [address] any warranty or liability for your use of this information.

## 2020-03-31 NOTE — PROGRESS NOTES
[facility]  [address] [place], [facility] [address]  Phone: [phone]  Fax: [phone]    Pt's YOB: 1965    ASSESSMENT   Diagnoses and all orders for this visit:    1. Lumbar facet arthropathy    2. Neuritis  -     gabapentin (Neurontin) 300 mg capsule; Take 2 Caps by mouth two (2) times a day. Max Daily Amount: 1,200 mg.    3. Lumbar pain  -     traMADoL (ULTRAM) 50 mg tablet; Take 1 Tab by mouth every six (6) hours as needed for Pain for up to 7 days. Max Daily Amount: 200 mg.    4. Spinal stenosis of lumbar region without neurogenic claudication    5. Bilateral hand numbness  -     gabapentin (Neurontin) 300 mg capsule; Take 2 Caps by mouth two (2) times a day. Max Daily Amount: 1,200 mg. IMPRESSION AND PLAN:  Tiffani Pope is a 48 y.o. female with history of cervical and lumbar pain. She admits to improvement in her lower back pain since her last office visit. Pt denies any weakness in the arms or loss of balance but notes occasionally issues with manual dexterity, particularly when she experiences swelling in her hands. She takes Neurontin 300 mg 1 cap QAM, 1 in the afternoon with lunch, and 2 caps QHS which helps with sleep. Pt also takes Ultram 50 mg intermittently as her pain warrants. 1) Pt was given information on lumbar arthritis exercises. 2) She received a refill of Neurontin 300 mg 2 caps BID. 3) Pt also received a refill of Ultram 50 mg 1 tab Q8 hours prn severe pain-- will continue to prescribe this on a limited basis and she is aware to get this from 1 physician. She does not follow up with          [facility] until June -- she will call if she needs another refill. Her MME is 5-10 and she keeps her medication secure  4) Ms. Mari Murillo has a reminder for a \"due or due soon\" health maintenance.  I have asked that she contact her primary care provider, Chandan Fu MD, for follow-up on this health maintenance. 5)  demonstrated consistency with prescribing. Follow-up and Dispositions    · Return in about 3 months (around 6/30/2020) for medication follow up. CPT Codes 90141-03255 for Established Patients may apply to this TeleHealth Visit  Time-based coding, delete if not needed: I spent at least 15 minutes with this established patient, and >50% of the time was spent counseling and/or coordinating care regarding her symptoms and medication evaluation. Due to this being a TeleHealth evaluation, many elements of the physical examination are unable to be assessed. Pursuant to the emergency declaration under the [address] [place] [address], Pending sale to [facility] waiver authority and the [employer] Act, this Virtual  Visit was conducted, with patient's consent, to reduce the patient's risk of exposure to COVID-19 and provide continuity of care for an established patient. Services were provided through a video synchronous discussion virtually to substitute for in-person clinic visit. HISTORY OF PRESENT ILLNESS:  Joy Lawrence is a 48 y.o. female with history of cervical and lumbar pain and is seen by synchronous (real-time) audio-video technology on 3/31/2020 with her consent for follow up. She admits to improvement in her lower back pain since her last office visit. Pt reports increased pain in the legs if she sits for extended periods of time. Of note, she has a sedentary desk job. She denies any weakness in the arms or loss of balance but notes occasionally issues with manual dexterity, particularly when she experiences swelling in her hands. Pt is followed by rheumatologist, Dr. Danis Hogan, and is scheduled to have a yearly follow up with her soon. She notes that her PCP, Liban Stewart MD, recently ordered a brain MRI.  Pt has undergone multiple tests with inconclusive results and continues to experience numbness/tingling that alternates between her hands. She reports that the numbness/tingling generally improves when she changes positions and with ambulation. Pt has been prescribed Neurontin 300 mg and takes 1 cap QAM, 1 in the afternoon with lunch, and 2 caps QHS. She admits to sedation when taking 2 caps of the Neurontin, so she split up her morning dose. Pt reports no pain in the legs since taking the Neurontin and notes that she is sleeping better. She also takes Ultram 50 mg intermittently as her pain warrants. Pt at this time desires to continue with current care.     Pain Scale: 2/10    PCP: Marylene Prim, MD     Past Medical History:   Diagnosis Date    Endocrine disease     hypothyrodism    Environmental allergies     Hypertension         Social History     Socioeconomic History    Marital status:      Spouse name: Not on file    Number of children: Not on file    Years of education: Not on file    Highest education level: Not on file   Occupational History    Not on file   Social Needs    Financial resource strain: Not on file    Food insecurity     Worry: Not on file     Inability: Not on file    Transportation needs     Medical: Not on file     Non-medical: Not on file   Tobacco Use    Smoking status: Never Smoker    Smokeless tobacco: Never Used   Substance and Sexual Activity    Alcohol use: No    Drug use: No    Sexual activity: Not on file   Lifestyle    Physical activity     Days per week: Not on file     Minutes per session: Not on file    Stress: Not on file   Relationships    Social connections     Talks on phone: Not on file     Gets together: Not on file     Attends [employer] service: Not on file     Active member of club or organization: Not on file     Attends meetings of clubs or organizations: Not on file     Relationship status: Not on file    Intimate partner violence     Fear of current or ex partner: Not on file     Emotionally abused: Not on file     Physically abused: Not on file     Forced sexual activity: Not on file   Other Topics Concern     Service Not Asked    Blood Transfusions Not Asked    Caffeine Concern Not Asked    Occupational Exposure Not Asked    Hobby Hazards Not Asked    Sleep Concern Not Asked    Stress Concern Not Asked    Weight Concern Not Asked    Special Diet Not Asked    Back Care Not Asked    Exercise Not Asked    Bike Helmet Not Asked   [address] Not Asked    Self-Exams Not Asked   Social History Narrative    Not on file       Current Outpatient Medications   Medication Sig Dispense Refill    gabapentin (Neurontin) 300 mg capsule Take 2 Caps by mouth two (2) times a day. Max Daily Amount: 1,200 mg. 360 Cap 1    traMADoL (ULTRAM) 50 mg tablet Take 1 Tab by mouth every six (6) hours as needed for Pain for up to 7 days. Max Daily Amount: 200 mg. 28 Tab 0    ibuprofen (MOTRIN) 800 mg tablet Take 1 Tab by mouth every eight (8) hours as needed.  lisinopril (PRINIVIL, ZESTRIL) 20 mg tablet Take 1 Tab by mouth daily.  levothyroxine (SYNTHROID) 112 mcg tablet Take 112 mcg by mouth Daily (before breakfast).  FLUoxetine (PROZAC) 20 mg capsule Take 20 mg by mouth daily.  loratadine (CLARITIN) 10 mg tablet Take 1 Tab by mouth daily.  diazePAM (VALIUM) 2 mg tablet Take 1 Tab by mouth daily as needed.  ergocalciferol (ERGOCALCIFEROL) 50,000 unit capsule Take 50,000 Units by mouth every seven (7) days. Allergies   Allergen Reactions    Acetaminophen Other (comments)    Aspirin Shortness of Breath    Morphine Shortness of Breath    Penicillins Unknown (comments)         REVIEW OF SYSTEMS    Constitutional: Negative for fever, chills, or weight change. Musculoskeletal: Positive for lumbar pain. Skin: Negative for rash. Neurological: Negative for headaches or dizziness. Positive for numbness in both hands. Endo/Heme/Allergies: Negative for increased bruising.    Psychiatric/Behavioral: Negative for difficulty with sleep. IMAGING:    Cervical spine MRI from 10/23/2019 was personally reviewed with the patient and demonstrated:  IMPRESSION:  1. Mild to moderate cervical degenerative disc disease and moderate to severe facet arthropathy. 2. C4/5: Mild canal stenosis and cord deformity. Severe left foramen stenosis. 3. No high-grade canal or foramen stenosis at any other level.     Lumbar spine MRI from 10/23/2019 was personally reviewed with the patient and demonstrated:  IMPRESSION:  1. Mild to moderate degenerative disc disease, with slight malalignment at some levels. degenerative facet arthropathy, severe at L4/5. [place] disease. 2. L4/5: Severe facet arthropathy with slight anterolisthesis, moderately narrows the spinal canal. Mild right foramen stenosis. 3. No high-grade canal or foramen stenosis at any other level.        Bilateral upper extremity NCV & EMG from 01/31/2020 was personally reviewed with the patient and demonstrated: All nerve conduction studies (as indicated in the following tables) were within normal limits.  All left vs. right side differences were within normal limits.       All examined muscles (as indicated in the following table) showed no evidence of electrical instability.       INTERPRETATION  This was a normal nerve conduction and EMG study showing there to be no signs of neuropathy, myopathy, or radiculopathy in the nerves and muscles tested.      CLINICAL INTERPRETATION  Her electrodiagnostic findings do not appear to explain her symptoms.     EMG/NCS of Lower Extremities from 12/11/2019 was reviewed with the patient and demonstrated:  12/11/2019     ELECTROMYOGRAPHY AND NERVE CONDUCTION STUDIES     Render Wyatt referred by Dr. Guajardo Can electrodiagnostic evaluation of BLE paraesthesia.     NCV & EMG Findings:   All examined muscles (as indicated in the following table) showed no evidence of electrical instability.       INTERPRETATION  This was a normal nerve conduction and EMG study showing there to be no signs of neuropathy, myopathy, or radiculopathy in the nerves and muscles tested.      TECHNICAL LIMITATIONS  Soft tissue at points of stimulation resulted in distally low amplitudes, particularly at the peroneal nerves. This is not considered to be truly abnormal.      CLINICAL INTERPRETATION  Her electrodiagnostic findings do not provide explanation for her symptoms.       Written by Gala Hopper, as dictated by Perry Overton MD.  I, Dr. Perry Overton confirm that all documentation is accurate.

## 2020-06-05 ENCOUNTER — TELEPHONE (OUTPATIENT)
Dept: ORTHOPEDIC SURGERY | Age: 55
End: 2020-06-05

## 2020-06-05 NOTE — TELEPHONE ENCOUNTER
Pt states she thinks her gabapentin may need adjustment. The leg cramps are coming back at night but only the last 2 nights. She commented her symptoms are the same as prior to being prescribed the gabapentin.      [facility]    Pt D#[phone]

## 2020-06-08 NOTE — TELEPHONE ENCOUNTER
Returned call to patient, Reached voicemail identified as \"Betsey\", left message, identified myself/facility/callback number, requested return call to facility.

## 2020-06-16 NOTE — TELEPHONE ENCOUNTER
Returned call to patient, verified Name/, per patient she is taking gabapentin 1 po in the am, 1 po in the afternoon, 2 po q evening. Provided patient with instructions to slowly taper up to 2 po TID. Patient verbalized agreement/understanding. Per patient, she does not need a refill at this time. Patient scheduled 4 week f/u to allow medication time to build up her system. No further action required at this time.

## 2020-07-13 ENCOUNTER — OFFICE VISIT (OUTPATIENT)
Dept: ORTHOPEDIC SURGERY | Age: 55
End: 2020-07-13

## 2020-07-13 ENCOUNTER — TELEPHONE (OUTPATIENT)
Dept: ORTHOPEDIC SURGERY | Age: 55
End: 2020-07-13

## 2020-07-13 VITALS
TEMPERATURE: 97.9 F | HEIGHT: 68 IN | WEIGHT: 223 LBS | BODY MASS INDEX: 33.8 KG/M2 | HEART RATE: 80 BPM | SYSTOLIC BLOOD PRESSURE: 108 MMHG | OXYGEN SATURATION: 94 % | DIASTOLIC BLOOD PRESSURE: 72 MMHG | RESPIRATION RATE: 14 BRPM

## 2020-07-13 DIAGNOSIS — M47.816 LUMBAR FACET ARTHROPATHY: Primary | ICD-10-CM

## 2020-07-13 DIAGNOSIS — M54.50 LUMBAR PAIN: ICD-10-CM

## 2020-07-13 DIAGNOSIS — M48.02 CERVICAL SPINAL STENOSIS: ICD-10-CM

## 2020-07-13 DIAGNOSIS — M48.061 SPINAL STENOSIS OF LUMBAR REGION WITHOUT NEUROGENIC CLAUDICATION: ICD-10-CM

## 2020-07-13 DIAGNOSIS — M79.89 RIGHT LEG SWELLING: ICD-10-CM

## 2020-07-13 DIAGNOSIS — M79.2 NEURITIS: ICD-10-CM

## 2020-07-13 RX ORDER — GABAPENTIN 300 MG/1
CAPSULE ORAL
Qty: 360 CAP | Refills: 1 | Status: SHIPPED | OUTPATIENT
Start: 2020-07-13

## 2020-07-13 RX ORDER — TRAMADOL HYDROCHLORIDE 50 MG/1
50 TABLET ORAL
COMMUNITY
Start: 2020-06-10

## 2020-07-13 NOTE — PROGRESS NOTES
Mike Dias presents today for   Chief Complaint   Patient presents with    Leg Pain     bilateral    Follow-up       Is someone accompanying this pt? no    Is the patient using any DME equipment during OV? NO    Depression Screening:  3 most recent PHQ Screens 7/13/2020   Little interest or pleasure in doing things Not at all   Feeling down, depressed, irritable, or hopeless Not at all   Total Score PHQ 2 0       Learning Assessment:  Learning Assessment 7/13/2020   PRIMARY LEARNER Patient   HIGHEST LEVEL OF EDUCATION - PRIMARY LEARNER  4 YEARS [address] [place] [address] CAREGIVER No   PRIMARY LANGUAGE ENGLISH   LEARNER PREFERENCE PRIMARY DEMONSTRATION     VIDEOS     READING   ANSWERED BY Patient   RELATIONSHIP SELF       Abuse Screening:  Abuse Screening Questionnaire 7/13/2020   Do you ever feel afraid of your partner? N   Are you in a relationship with someone who physically or mentally threatens you? N   Is it safe for you to go home? Y       OPIOID RISK TOOL  No flowsheet data found. Pt currently taking Antiplatelet therapy? NO    Coordination of Care:  1. Have you been to the ER, urgent care clinic since your last visit? [facility] in April 15, 2020  Hospitalized since your last visit? NO    2. Have you seen or consulted any other health care providers outside of the [address] [place] [address] since your last visit? Yes as above. Include any pap smears or colon screening.        Last  Checked 07/13/2020

## 2020-07-13 NOTE — PROGRESS NOTES
[facility]  [address] [place], [facility] [address]  Phone: [phone]  Fax: [phone]    Pt's YOB: 1965    ASSESSMENT   Diagnoses and all orders for this visit:    1. Lumbar facet arthropathy    2. Neuritis  -     gabapentin (Neurontin) 300 mg capsule; Take 1 cap by mouth in the morning and 3 at night as directed. 3. Right leg swelling  -     DUPLEX LOWER EXT VENOUS RIGHT; Future    4. Lumbar pain    5. Spinal stenosis of lumbar region without neurogenic claudication    6. Cervical spinal stenosis    7. BMI 33.0-33.9,adult         IMPRESSION AND PLAN:  Mike Dias is a 48 y.o. female with history of cervical and lumbar pain. Pt complains of severe muscle spasms in both legs, primarily at night. She admits to increased low back pain since she has recently tired to increase her walking and exercise. Pt takes Neurontin 300 mg 1 cap QAM, 1 cap in the afternoon, and 2 caps QHS. 1) Pt was given information on lumbar arthritis exercises. 2) She received a refill of Neurontin 300 mg 1 cap QAM and 3 caps QHS-- she may adjust her medication depending on her sedation and symptoms. 3) I recommended the patient try water exercise. 4) A right lower extremity doppler study was ordered due to increase in swelling; pt has history of superficial occlusion. 5) Ms. Magui Rahman has a reminder for a \"due or due soon\" health maintenance. I have asked that she contact her primary care provider, Cheko Ellis MD, for follow-up on this health maintenance. 6)  demonstrated consistency with prescribing. 7) Weight loss recommended  Follow-up and Dispositions    · Return in about 4 weeks (around 8/10/2020) for Medication follow up, Diagnostic Test follow up. HISTORY OF PRESENT ILLNESS:  Mike Dias is a 48 y.o. female with history of cervical and lumbar pain and presents to the office today for follow up.  Pt complains of severe muscle spasms in both legs, primarily at night. She admits to increased low back pain since she has recently tried to increase her walking and exercise. She notices significant increase ub muscle spasm at night with walking longer distances. Pt admits that she recently decreased from walking 3 miles to 1.5-2 miles daily. She also performs water exercise with benefit. Pt has been prescribed Neurontin 300 mg and takes 1 cap QAM, 1 cap in the afternoon, and 2 caps QHS. She admits to sedation when she tried increasing the Neurontin 300 mg to 2 caps TID. Pt notes that she generally waits to take the Neurontin after she has done most of her driving. She reports some swelling in the right leg recently and this does not improve with elevation or when laying down. Pt notes that she had a vein ablation in the right leg in 4/2019 and reports two superficial venous thromboses in the past.   She also has received prescriptions from Dr Agus Mcdonald for Tramadol but does not want to use this regularly and prefers non-opioid means for pain management. Pt at this time desires to proceed with medication evaluation and a doppler study.     Pain Scale: 7/10    PCP: Brittani Gillespie MD     Past Medical History:   Diagnosis Date    Endocrine disease     hypothyrodism    Environmental allergies     Hypertension         Social History     Socioeconomic History    Marital status:      Spouse name: Not on file    Number of children: Not on file    Years of education: Not on file    Highest education level: Not on file   Occupational History    Not on file   Social Needs    Financial resource strain: Not on file    Food insecurity     Worry: Not on file     Inability: Not on file    Transportation needs     Medical: Not on file     Non-medical: Not on file   Tobacco Use    Smoking status: Never Smoker    Smokeless tobacco: Never Used   Substance and Sexual Activity    Alcohol use: No    Drug use: No    Sexual activity: Not on file   Lifestyle    Physical activity     Days per week: Not on file     Minutes per session: Not on file    Stress: Not on file   Relationships    Social connections     Talks on phone: Not on file     Gets together: Not on file     Attends [employer] service: Not on file     Active member of club or organization: Not on file     Attends meetings of clubs or organizations: Not on file     Relationship status: Not on file    Intimate partner violence     Fear of current or ex partner: Not on file     Emotionally abused: Not on file     Physically abused: Not on file     Forced sexual activity: Not on file   Other Topics Concern     Service Not Asked    Blood Transfusions Not Asked    Caffeine Concern Not Asked    Occupational Exposure Not Asked   Geoffery Fillers Hazards Not Asked    Sleep Concern Not Asked    Stress Concern Not Asked    Weight Concern Not Asked    Special Diet Not Asked    Back Care Not Asked    Exercise Not Asked    Bike Helmet Not Asked   [address] Not Asked    Self-Exams Not Asked   Social History Narrative    Not on file       Current Outpatient Medications   Medication Sig Dispense Refill    traMADoL (ULTRAM) 50 mg tablet Take 50 mg by mouth every six (6) hours as needed.  gabapentin (Neurontin) 300 mg capsule Take 1 cap by mouth in the morning and 3 at night as directed. 360 Cap 1    gabapentin (Neurontin) 300 mg capsule Take 2 Caps by mouth two (2) times a day. Max Daily Amount: 1,200 mg. (Patient taking differently: Take 300 mg by mouth. Take 1 cap PO Qam, 1 cap PO Q NOON, and 2 caps PO QPM) 360 Cap 1    ibuprofen (MOTRIN) 800 mg tablet Take 1 Tab by mouth every eight (8) hours as needed.  lisinopril (PRINIVIL, ZESTRIL) 20 mg tablet Take 1 Tab by mouth daily.  levothyroxine (SYNTHROID) 125 mcg tablet Take 125 mcg by mouth Daily (before breakfast).  FLUoxetine (PROZAC) 20 mg capsule Take 20 mg by mouth daily.       loratadine (CLARITIN) 10 mg tablet Take 1 Tab by mouth daily.  diazePAM (VALIUM) 2 mg tablet Take 1 Tab by mouth daily as needed.  ergocalciferol (ERGOCALCIFEROL) 50,000 unit capsule Take 50,000 Units by mouth every seven (7) days. Allergies   Allergen Reactions    Acetaminophen Other (comments)    Aspirin Shortness of Breath    Morphine Shortness of Breath    Penicillins Unknown (comments)         REVIEW OF SYSTEMS    Constitutional: Negative for fever, chills, or weight change. Respiratory: Negative for cough or shortness of breath. Cardiovascular: Negative for chest pain or palpitations; positive for right leg swelling. Gastrointestinal: Negative for acid reflux, change in bowel habits, or constipation. Genitourinary: Negative for dysuria and flank pain. Musculoskeletal: Positive for lumbar pain and leg pain at night. Neurological: Negative for headaches, dizziness, or numbness. Endo/Heme/Allergies: Negative for increased bruising. Psychiatric/Behavioral:  Positive for difficulty with sleep. As per HPI    PHYSICAL EXAMINATION  Visit Vitals  /72   Pulse 80   Temp 97.9 °F (36.6 °C) (Oral)   Resp 14   Ht 5' 8\" (1.727 m)   Wt 223 lb (101.2 kg)   SpO2 94%   BMI 33.91 kg/m²       Constitutional: Awake, alert, and in no acute distress. Neurological: 1+ symmetrical DTRs in the upper extremities. 1+ symmetrical DTRs in the lower extremities. Sensation to light touch is intact. Negative Andrade's sign bilaterally. Skin: warm, dry, and intact. Musculoskeletal: Tenderness to palpation in the lower lumbar region. Moderate pain with extension and axial loading. No pain with internal or external rotation of her hips. Negative straight leg raise bilaterally.   1-2+ pretibial edema on the right and  Trace on the left; negative Niya's     Hip Flex  Quads Hamstrings Ankle DF EHL Ankle PF   Right +4/5 +4/5 +4/5 +4/5 +4/5 +4/5   Left +4/5 +4/5 +4/5 +4/5 +4/5 +4/5     IMAGING:    Cervical spine MRI from 10/23/2019 was personally reviewed with the patient and demonstrated:  IMPRESSION:  1. Mild to moderate cervical degenerative disc disease and moderate to severe facet arthropathy. 2. C4/5: Mild canal stenosis and cord deformity. Severe left foramen stenosis. 3. No high-grade canal or foramen stenosis at any other level.     Lumbar spine MRI from 10/23/2019 was personally reviewed with the patient and demonstrated:  IMPRESSION:  1. Mild to moderate degenerative disc disease, with slight malalignment at some levels. degenerative facet arthropathy, severe at L4/5. [place] disease. 2. L4/5: Severe facet arthropathy with slight anterolisthesis, moderately narrows the spinal canal. Mild right foramen stenosis. 3. No high-grade canal or foramen stenosis at any other level.        Bilateral upper extremity NCV & EMG from 01/31/2020 was personally reviewed with the patient and demonstrated: All nerve conduction studies (as indicated in the following tables) were within normal limits.  All left vs. right side differences were within normal limits.       All examined muscles (as indicated in the following table) showed no evidence of electrical instability.       INTERPRETATION  This was a normal nerve conduction and EMG study showing there to be no signs of neuropathy, myopathy, or radiculopathy in the nerves and muscles tested.      CLINICAL INTERPRETATION  Her electrodiagnostic findings do not appear to explain her symptoms.     EMG/NCS of Lower Extremities from 12/11/2019 was reviewed with the patient and demonstrated:  Findings:   All examined muscles (as indicated in the following table) showed no evidence of electrical instability.       INTERPRETATION  This was a normal nerve conduction and EMG study showing there to be no signs of neuropathy, myopathy, or radiculopathy in the nerves and muscles tested.      TECHNICAL LIMITATIONS  Soft tissue at points of stimulation resulted in distally low amplitudes, particularly at the peroneal nerves. This is not considered to be truly abnormal.      CLINICAL INTERPRETATION  Her electrodiagnostic findings do not provide explanation for her symptoms.       Written by Sylvain Ewing, as dictated by Rossy Holm MD.  I, Dr. Rossy Holm confirm that all documentation is accurate.

## 2020-07-13 NOTE — PATIENT INSTRUCTIONS
Low Back Arthritis: Exercises  Introduction  Here are some examples of typical rehabilitation exercises for your condition. Start each exercise slowly. Ease off the exercise if you start to have pain. Your doctor or physical therapist will tell you when you can start these exercises and which ones will work best for you. When you are not being active, find a comfortable position for rest. Some people are comfortable on the floor or a medium-firm bed with a small pillow under their head and another under their knees. Some people prefer to lie on their side with a pillow between their knees. Don't stay in one position for too long. Take short walks (10 to 20 minutes) every 2 to 3 hours. Avoid slopes, hills, and stairs until you feel better. Walk only distances you can manage without pain, especially leg pain. How to do the exercises  Pelvic tilt   1. Lie on your back with your knees bent. 2. \"Brace\" your stomachtighten your muscles by pulling in and imagining your belly button moving toward your spine. 3. Press your lower back into the floor. You should feel your hips and pelvis rock back. 4. Hold for 6 seconds while breathing smoothly. 5. Relax and allow your pelvis and hips to rock forward. 6. Repeat 8 to 12 times. Back stretches   1. Get down on your hands and knees on the floor. 2. Relax your head and allow it to droop. Round your back up toward the ceiling until you feel a nice stretch in your upper, middle, and lower back. Hold this stretch for as long as it feels comfortable, or about 15 to 30 seconds. 3. Return to the starting position with a flat back while you are on your hands and knees. 4. Let your back sway by pressing your stomach toward the floor. Lift your buttocks toward the ceiling. 5. Hold this position for 15 to 30 seconds. 6. Repeat 2 to 4 times. Follow-up care is a key part of your treatment and safety.  Be sure to make and go to all appointments, and call your doctor if you are having problems. It's also a good idea to know your test results and keep a list of the medicines you take. Where can you learn more? Go to http://www.[employer].com/  Enter T094 in the search box to learn more about \"Low Back Arthritis: Exercises. \"  Current as of: March 2, 2020               Content Version: 12.5  © 1714-8459 [employer], [employer]. Care instructions adapted under license by [employer] (which disclaims liability or warranty for this information). If you have questions about a medical condition or this instruction, always ask your healthcare professional. [place] [address] any warranty or liability for your use of this information.

## 2020-07-13 NOTE — TELEPHONE ENCOUNTER
Order and office notes faxed to [address]  to have them call patient in the AM to set up Venous doppler study lower extremity

## 2020-07-13 NOTE — TELEPHONE ENCOUNTER
----- Message from Liz Florence LPN sent at 1/52/2131  4:38 PM EDT -----  Regarding: DOPPLER STUDIES

## 2020-07-13 NOTE — LETTER
7/14/20 Patient: Bob Trinh YOB: 1965 Date of Visit: 7/13/2020 Tucker Da Silva MD 
[address] [place] [address] VIA Facsimile: [phone] Dear Tucker Da Silva MD, Thank you for referring Ms. Claudia Thakur to [facility] for evaluation. My notes for this consultation are attached. If you have questions, please do not hesitate to call me. I look forward to following your patient along with you. Sincerely, Henry Carter MD

## 2020-07-23 ENCOUNTER — HOSPITAL ENCOUNTER (OUTPATIENT)
Dept: VASCULAR SURGERY | Age: 55
Discharge: HOME | End: 2020-07-23
Attending: PHYSICAL MEDICINE & REHABILITATION
Payer: OTHER GOVERNMENT

## 2020-07-23 DIAGNOSIS — M79.89 RIGHT LEG SWELLING: ICD-10-CM

## 2020-07-23 PROCEDURE — 93971 EXTREMITY STUDY: CPT

## 2020-07-28 ENCOUNTER — TELEPHONE (OUTPATIENT)
Dept: ORTHOPEDIC SURGERY | Age: 55
End: 2020-07-28

## 2020-07-28 NOTE — TELEPHONE ENCOUNTER
Pt aware of normal results for doppler study -- no DVT-- she will follow up with her vein specialist for further input on her leg swelling --  She is currently using 4 - 300 mg gabapentin at night and this has helped with her leg pain-- she does not need a refill at this time -- the office will contact her to set up a 3 month follow up for further medication but if she needs anything in the interim, she will call for a sooner appt

## 2020-07-28 NOTE — TELEPHONE ENCOUNTER
Patient called requesting the results of her ultrasound. She was wondering if she can have the results over the phone.  Please advise patient at [phone]

## 2020-07-29 NOTE — TELEPHONE ENCOUNTER
Returned call to patient, verified Name/, patient scheduled for f/u [facility] appt with Dr. Carmona. No further action required at this time.

## 2020-10-19 ENCOUNTER — VIRTUAL VISIT (OUTPATIENT)
Dept: ORTHOPEDIC SURGERY | Age: 55
End: 2020-10-19
Payer: OTHER GOVERNMENT

## 2020-10-19 DIAGNOSIS — M54.50 LUMBAR PAIN: ICD-10-CM

## 2020-10-19 DIAGNOSIS — M47.816 LUMBAR FACET ARTHROPATHY: Primary | ICD-10-CM

## 2020-10-19 DIAGNOSIS — M79.89 RIGHT LEG SWELLING: ICD-10-CM

## 2020-10-19 DIAGNOSIS — M79.2 NEURITIS: ICD-10-CM

## 2020-10-19 PROCEDURE — 99213 OFFICE O/P EST LOW 20 MIN: CPT | Performed by: PHYSICAL MEDICINE & REHABILITATION

## 2020-10-19 NOTE — PROGRESS NOTES
[facility]  [address], [address]  Phone: [phone]  Fax: [phone]    Pt's YOB: 1965    ASSESSMENT   Diagnoses and all orders for this visit:    1. Lumbar facet arthropathy  -     SCHEDULE SURGERY    2. Neuritis  -     SCHEDULE SURGERY    3. Right leg swelling    4. Lumbar pain  -     SCHEDULE SURGERY    5. BMI 33.0-33.9,adult         IMPRESSION AND PLAN:  Lisa Colby is a 50 y.o. female with history of cervical and lumbar. Pt reports increased lower back pain when sitting or laying for prolonged periods of time. She reports relief in her right leg pain since she discontinued the Prozac and Claritin. Pt notes temporary relief with a bilateral L4-5 facet injection on 11/20/2019.    1) Pt was given information on lumbar arthritis exercises. 2) She was scheduled for a bilateral L4-5 facet injection. 3) Pt will continue taking Neurontin as prescribed and does not need a refill at this time. 4) Ms. Joann Munoz has a reminder for a \"due or due soon\" health maintenance. I have asked that she contact her primary care provider, Fran Li MD, for follow-up on this health maintenance. 5)  demonstrated consistency with prescribing. Follow-up and Dispositions    · Return in about 4 weeks (around 11/16/2020). CPT Codes 25751-42842 for Established Patients may apply to this TeleHealth Visit. Time-based coding, delete if not needed: I spent at least 15 minutes with this established patient, and >50% of the time was spent counseling and/or coordinating care regarding her symptoms, medications, and steroid injections. Due to this being a TeleHealth evaluation, many elements of the physical examination are unable to be assessed.      Pursuant to the emergency declaration under the [address], [facility] waiver authority and the [employer] Appropriations Act, this Virtual Visit was conducted, with patient's consent, to reduce the patient's risk of exposure to COVID-19 and provide continuity of care for an established patient. Services were provided through a video synchronous discussion virtually to substitute for in-person clinic visit. HISTORY OF PRESENT ILLNESS:  Holly Middleton is a 50 y.o. female with history of cervical and lumbar pain is seen by synchronous (real-time) audio-video technology at the [facility] location via doxy. me on 10/19/2020 with her verbal consent for follow up. Pt reports increased lower back pain when sitting or laying for prolonged periods of time. She reports relief in her right leg pain since she discontinued the Prozac and Claritin. Pt notes relief with previous steroid injections (bilateral L4-5 facet injections on 11/20/2019). She followed up with Dr. Diana Elmore in 9/2020 for frequent migraine headaches and he tapered her off the Motrin. Pt notes that she now takes the Motrin intermittently as needed for musculoskeletal pain. Pt continued to take Neurontin 300 mg. Pt at this time desires to proceed with steroid injections.     Pain Scale: /10    PCP: Steffi Houston MD     Past Medical History:   Diagnosis Date    Endocrine disease     hypothyrodism    Environmental allergies     Hypertension         Social History     Socioeconomic History    Marital status:      Spouse name: Not on file    Number of children: Not on file    Years of education: Not on file    Highest education level: Not on file   Occupational History    Not on file   Social Needs    Financial resource strain: Not on file    Food insecurity     Worry: Not on file     Inability: Not on file    Transportation needs     Medical: Not on file     Non-medical: Not on file   Tobacco Use    Smoking status: Never Smoker    Smokeless tobacco: Never Used   Substance and Sexual Activity    Alcohol use: No    Drug use: No    Sexual activity: Not on file   Lifestyle    Physical activity     Days per week: Not on file     Minutes per session: Not on file    Stress: Not on file   Relationships    Social connections     Talks on phone: Not on file     Gets together: Not on file     Attends Rastafarian service: Not on file     Active member of club or organization: Not on file     Attends meetings of clubs or organizations: Not on file     Relationship status: Not on file    Intimate partner violence     Fear of current or ex partner: Not on file     Emotionally abused: Not on file     Physically abused: Not on file     Forced sexual activity: Not on file   Other Topics Concern     Service Not Asked    Blood Transfusions Not Asked    Caffeine Concern Not Asked    Occupational Exposure Not Asked   Vergia Driver Hazards Not Asked    Sleep Concern Not Asked    Stress Concern Not Asked    Weight Concern Not Asked    Special Diet Not Asked    Back Care Not Asked    Exercise Not Asked    Bike Helmet Not Asked   [address] Not Asked    Self-Exams Not Asked   Social History Narrative    Not on file       Current Outpatient Medications   Medication Sig Dispense Refill    gabapentin (Neurontin) 300 mg capsule Take 2 Caps by mouth two (2) times a day. Max Daily Amount: 1,200 mg. (Patient taking differently: Take 1,200 mg by mouth nightly. Take 1 cap PO Qam, 1 cap PO Q NOON, and 2 caps PO QPM) 360 Cap 1    ibuprofen (MOTRIN) 800 mg tablet Take 1 Tab by mouth every eight (8) hours as needed.  lisinopril (PRINIVIL, ZESTRIL) 20 mg tablet Take 1 Tab by mouth daily.  levothyroxine (SYNTHROID) 125 mcg tablet Take 125 mcg by mouth Daily (before breakfast).  traMADoL (ULTRAM) 50 mg tablet Take 50 mg by mouth every six (6) hours as needed.  gabapentin (Neurontin) 300 mg capsule Take 1 cap by mouth in the morning and 3 at night as directed. 360 Cap 1    diazePAM (VALIUM) 2 mg tablet Take 1 Tab by mouth daily as needed.       ergocalciferol (ERGOCALCIFEROL) 50,000 unit capsule Take 50,000 Units by mouth every seven (7) days.  FLUoxetine (PROZAC) 20 mg capsule Take 20 mg by mouth daily.  loratadine (CLARITIN) 10 mg tablet Take 1 Tab by mouth daily. Allergies   Allergen Reactions    Acetaminophen Other (comments)    Aspirin Shortness of Breath    Morphine Shortness of Breath    Penicillins Unknown (comments)         REVIEW OF SYSTEMS    Constitutional: Negative for fever, chills, or weight change. Respiratory: Negative for cough or shortness of breath. Cardiovascular: Negative for chest pain or palpitations. Gastrointestinal: Negative for acid reflux, change in bowel habits, or constipation. Genitourinary: Negative for dysuria and flank pain. Musculoskeletal: Positive for lumbar pain. Neurological: Negative for headaches, dizziness, or numbness. Psychiatric/Behavioral: Negative for difficulty with sleep. IMAGING:    Cervical spine MRI from 10/23/2019 was personally reviewed with the patient and demonstrated:  IMPRESSION:  1. Mild to moderate cervical degenerative disc disease and moderate to severe facet arthropathy. 2. C4/5: Mild canal stenosis and cord deformity. Severe left foramen stenosis. 3. No high-grade canal or foramen stenosis at any other level.     Lumbar spine MRI from 10/23/2019 was personally reviewed with the patient and demonstrated:  IMPRESSION:  1. Mild to moderate degenerative disc disease, with slight malalignment at some levels. degenerative facet arthropathy, severe at L4/5. [place] disease. 2. L4/5: Severe facet arthropathy with slight anterolisthesis, moderately narrows the spinal canal. Mild right foramen stenosis. 3. No high-grade canal or foramen stenosis at any other level.        Bilateral upper extremity NCV & EMG from 01/31/2020 was personally reviewed with the patient and demonstrated:   All nerve conduction studies (as indicated in the following tables) were within normal limits.  All left vs. right side differences were within normal limits.       All examined muscles (as indicated in the following table) showed no evidence of electrical instability.       INTERPRETATION  This was a normal nerve conduction and EMG study showing there to be no signs of neuropathy, myopathy, or radiculopathy in the nerves and muscles tested.      CLINICAL INTERPRETATION  Her electrodiagnostic findings do not appear to explain her symptoms.     EMG/NCS of Lower Extremities from 12/11/2019 was reviewed with the patient and demonstrated:  Findings: All examined muscles (as indicated in the following table) showed no evidence of electrical instability.       INTERPRETATION  This was a normal nerve conduction and EMG study showing there to be no signs of neuropathy, myopathy, or radiculopathy in the nerves and muscles tested.      TECHNICAL LIMITATIONS  Soft tissue at points of stimulation resulted in distally low amplitudes, particularly at the peroneal nerves. This is not considered to be truly abnormal.      CLINICAL INTERPRETATION  Her electrodiagnostic findings do not provide explanation for her symptoms.     Lower extremity duplex study from 7/23/2020 was personally reviewed with the patient and demonstrated:  Interpretation Summary:  No evidence of DVT within the right lower extremity or left common femoral vein  Right Lower Venous   Technically difficult exam due to: marked edema. No evidence of deep vein thrombosis in the common femoral, profunda femoral, femoral, popliteal, posterior tibial, and peroneal veins. The veins were imaged in the transverse and longitudinal planes. The vessels showed normal color filling and compressibility. Doppler interrogation showed phasic and spontaneous flow. Left Lower Venous   For comparison purposes, the left common femoral vein was briefly interrogated. These vein demonstrates normal color filing and compressibility.  Doppler flow was phasic and spontaneous. The left posterior tibial artery has triphasic waveforms. Comparison Study information  Prior Study  The exam was compared to the study performed on 3/6/2017. The acute superficial thrombus in the right lower extremity was not identified on today's exam.  No other significant changes. Written by Veronica Schmitt, as dictated by Mohan Sy MD.  I, Dr. Mohan Sy confirm that all documentation is accurate.

## 2020-10-19 NOTE — PATIENT INSTRUCTIONS
Low Back Arthritis: Exercises Introduction Here are some examples of typical rehabilitation exercises for your condition. Start each exercise slowly. Ease off the exercise if you start to have pain. Your doctor or physical therapist will tell you when you can start these exercises and which ones will work best for you. When you are not being active, find a comfortable position for rest. Some people are comfortable on the floor or a medium-firm bed with a small pillow under their head and another under their knees. Some people prefer to lie on their side with a pillow between their knees. Don't stay in one position for too long. Take short walks (10 to 20 minutes) every 2 to 3 hours. Avoid slopes, hills, and stairs until you feel better. Walk only distances you can manage without pain, especially leg pain. How to do the exercises Pelvic tilt 1. Lie on your back with your knees bent. 2. \"Brace\" your stomachtighten your muscles by pulling in and imagining your belly button moving toward your spine. 3. Press your lower back into the floor. You should feel your hips and pelvis rock back. 4. Hold for 6 seconds while breathing smoothly. 5. Relax and allow your pelvis and hips to rock forward. 6. Repeat 8 to 12 times. Back stretches 1. Get down on your hands and knees on the floor. 2. Relax your head and allow it to droop. Round your back up toward the ceiling until you feel a nice stretch in your upper, middle, and lower back. Hold this stretch for as long as it feels comfortable, or about 15 to 30 seconds. 3. Return to the starting position with a flat back while you are on your hands and knees. 4. Let your back sway by pressing your stomach toward the floor. Lift your buttocks toward the ceiling. 5. Hold this position for 15 to 30 seconds. 6. Repeat 2 to 4 times. Follow-up care is a key part of your treatment and safety.  Be sure to make and go to all appointments, and call your doctor if you are having problems. It's also a good idea to know your test results and keep a list of the medicines you take. Where can you learn more? Go to http://www.gray.com/ Enter X338 in the search box to learn more about \"Low Back Arthritis: Exercises. \" Current as of: March 2, 2020               Content Version: 12.6 © 2006-2020 [employer]. Care instructions adapted under license by [employer] (which disclaims liability or warranty for this information). If you have questions about a medical condition or this instruction, always ask your healthcare professional. [address] any warranty or liability for your use of this information.

## 2020-10-19 NOTE — PROGRESS NOTES
Juanjo Bulnt presents today for   Chief Complaint   Patient presents with    Back Pain    Leg Pain       Is someone accompanying this pt? no    Is the patient using any DME equipment during OV? no    Depression Screening:  3 most recent PHQ Screens 7/13/2020   Little interest or pleasure in doing things Not at all   Feeling down, depressed, irritable, or hopeless Not at all   Total Score PHQ 2 0       Learning Assessment:  Learning Assessment 7/13/2020   PRIMARY LEARNER Patient   HIGHEST LEVEL OF EDUCATION - PRIMARY LEARNER  2 YEARS [address] [place] [address] CAREGIVER No   PRIMARY LANGUAGE ENGLISH   LEARNER PREFERENCE PRIMARY DEMONSTRATION     VIDEOS     READING   ANSWERED BY Patient   RELATIONSHIP SELF       Abuse Screening:  Abuse Screening Questionnaire 7/13/2020   Do you ever feel afraid of your partner? N   Are you in a relationship with someone who physically or mentally threatens you? N   Is it safe for you to go home? Y         Coordination of Care:  1. Have you been to the ER, urgent care clinic since your last visit? no  Hospitalized since your last visit? no    2. Have you seen or consulted any other health care providers outside of the [address] [place] [address] since your last visit? Yes, neurology Include any pap smears or colon screening.  no

## 2020-11-11 ENCOUNTER — APPOINTMENT (OUTPATIENT)
Dept: GENERAL RADIOLOGY | Age: 55
End: 2020-11-11
Attending: PHYSICAL MEDICINE & REHABILITATION
Payer: OTHER GOVERNMENT

## 2020-11-11 ENCOUNTER — HOSPITAL ENCOUNTER (OUTPATIENT)
Age: 55
Setting detail: OUTPATIENT SURGERY
Discharge: HOME | End: 2020-11-11
Attending: PHYSICAL MEDICINE & REHABILITATION | Admitting: PHYSICAL MEDICINE & REHABILITATION
Payer: OTHER GOVERNMENT

## 2020-11-11 VITALS
DIASTOLIC BLOOD PRESSURE: 85 MMHG | OXYGEN SATURATION: 97 % | HEART RATE: 81 BPM | RESPIRATION RATE: 20 BRPM | TEMPERATURE: 98.8 F | SYSTOLIC BLOOD PRESSURE: 123 MMHG

## 2020-11-11 PROCEDURE — 77030003676 HC NDL SPN MPRI -A: Performed by: PHYSICAL MEDICINE & REHABILITATION

## 2020-11-11 PROCEDURE — 74011250636 HC RX REV CODE- 250/636: Performed by: PHYSICAL MEDICINE & REHABILITATION

## 2020-11-11 PROCEDURE — 74011000250 HC RX REV CODE- 250: Performed by: PHYSICAL MEDICINE & REHABILITATION

## 2020-11-11 PROCEDURE — 77030039433 HC TY MYLEOGRAM BD -B: Performed by: PHYSICAL MEDICINE & REHABILITATION

## 2020-11-11 PROCEDURE — 2709999900 HC NON-CHARGEABLE SUPPLY: Performed by: PHYSICAL MEDICINE & REHABILITATION

## 2020-11-11 PROCEDURE — 74011000636 HC RX REV CODE- 636: Performed by: PHYSICAL MEDICINE & REHABILITATION

## 2020-11-11 PROCEDURE — 76010000009 HC PAIN MGT 0 TO 30 MIN PROC: Performed by: PHYSICAL MEDICINE & REHABILITATION

## 2020-11-11 PROCEDURE — 74011250637 HC RX REV CODE- 250/637: Performed by: PHYSICAL MEDICINE & REHABILITATION

## 2020-11-11 RX ORDER — LIDOCAINE HYDROCHLORIDE 10 MG/ML
INJECTION, SOLUTION EPIDURAL; INFILTRATION; INTRACAUDAL; PERINEURAL AS NEEDED
Status: DISCONTINUED | OUTPATIENT
Start: 2020-11-11 | End: 2020-11-11 | Stop reason: HOSPADM

## 2020-11-11 RX ORDER — DEXAMETHASONE SODIUM PHOSPHATE 100 MG/10ML
INJECTION INTRAMUSCULAR; INTRAVENOUS AS NEEDED
Status: DISCONTINUED | OUTPATIENT
Start: 2020-11-11 | End: 2020-11-11 | Stop reason: HOSPADM

## 2020-11-11 RX ORDER — DIAZEPAM 5 MG/1
5-20 TABLET ORAL ONCE
Status: COMPLETED | OUTPATIENT
Start: 2020-11-11 | End: 2020-11-11

## 2020-11-11 RX ADMIN — DIAZEPAM 10 MG: 5 TABLET ORAL at 13:16

## 2020-11-11 NOTE — H&P
Date of Surgery Update:  Jareth Troncoso was seen and examined. History and physical has been reviewed. The patient has been examined. There have been no significant clinical changes since the last office visit. The patient was counseled at length about the risks of chitra Covid-19 during their perioperative period and any recovery window from their procedure. The patient was made aware that chitra Covid-19  may worsen their prognosis for recovering from their procedure and lend to a higher morbidity and/or mortality risk. All material risks, benefits, and reasonable alternatives including postponing the procedure were discussed. The patient does  wish to proceed with the procedure at this time.         Signed By: Addie Orozco MD     November 11, 2020 1:30 PM

## 2020-11-11 NOTE — PROCEDURES
Facet Joint Block Procedure Note    Patient Name: Estella Wilson    Date of Procedure: November 11, 2020    Preoperative Diagnosis: Lumbar facet arthropathy [M47.816]    Post Operative Diagnosis:Lumbar facet arthropathy [M47.816]     Procedure:  bilateral  L4-L5 Facet Joint Block    Consent: Informed consent was obtained prior to the procedure. The patient was given the opportunity to ask questions regarding the procedure and its associated risks. In addition to the potential risks associated with the procedure itself, the patient was informed both verbally and in writing of potential side effects of the use of glucocorticoids. The patient appeared to comprehend the informed consent and desired to have the procedure performed. Procedure: The patient was placed in the prone position on the flouroscopy table and the back was prepped and draped in the usual sterile manner. At each blocked level, the exact location of the facet joint was identified with flouroscopy, and after local Lidocaine 1% injection, a #22 gauge spinal needle was then advanced toward the joint. A total of 10 mg of preservative free Dexamethasone and 5 cc of Lidocaine was injected around and equally divided among all of the sites. The patient tolerated the procedure well. The injection area was cleaned and bandaids applied. No excessive bleeding was noted. Patient dressed and was discharged to home with instructions.        Giancarlo Paul MD  November 11, 2020

## 2020-11-11 NOTE — DISCHARGE INSTRUCTIONS
[facility] [place] [facility]   ([place])  Dr. Alysa Cotton, Dr. Carmona, Dr. Ravi Martínez Spinal Procedure (Block) Instructions    * Do not drive a car, operate heavy machinery or dangerous equipment, or make important decisions for 12-24 hours. * Light activity as tolerated; may rest for the remainder of the day. * Resume pre-block medications including those from your other doctors. * Do not drink alcoholic beverages for 24 hours. Alcohol and the medications you have received may interact and cause an adverse reaction. * You may feel better this evening and worse tomorrow, as the numbing medications wears off and the steroid has yet to begin to work. After 48-72 hrs the steroid should begin to release bringing you relief. If you had a medial branch block, no steroids were used. The medial branch block is a test to see if you are a candidate for radiofrequency ablation (RFA). The anesthetic (numbing medicine)  will wear off by the next day. * You may shower this evening and remove any bandages. * Avoid hot tubs/pools/tub soaks and heating pads for 24 hours. You may use cold packs on the procedure site as tolerated for the first 24 hours. * If a headache develops, drink plenty of fluids and rest.  Take over the counter medications for headache if needed. If the headache continues longer than 24 hours, call MD at the [address] [place] [address]. [phone]    * Continue taking pain medications as needed. * You may resume your regular diet if tolerated. Otherwise, start with sips of water and advance slowly. * If Diabetic: check your blood sugar three times a day for the next 3 days. If your sugar is greater than 300 call your family doctor. If your sugar is greater than 400, have someone transport you to the nearest Emergency Room. * If you experience any of the following problems, Please Call the [address] [place] [address] Avenue at [phone].         * Excessive pain, swelling, redness or odor at or around the surgical area    * Fever of 101 or higher    * Nausea / Vomiting lasting longer than 4 hours or if unable to take medications. * Severe Headache    * Weakness or numbness in arms or legs that is not      resolving   * Any NEW signs of decreased circulation or nerve impairment in leg: change in color, swelling, persistent numbness, tingling                    * Prolonged increase in pain greater than 4 days      PATIENT INSTRUCTIONS:    After oral sedation, for 12-24 hours or while taking prescription Narcotics:  · Limit your activities  · Do not drive and operate hazardous machinery  · Do not make important personal or business decisions  · Do  not drink alcoholic beverages  · If you have not urinated within 8 hours after discharge, please contact your surgeon on call. *  Please give a list of your current medications to your Primary Care Provider. *  Please update this list whenever your medications are discontinued, doses are      changed, or new medications (including over-the-counter products) are added. *  Please carry medication information at all times in case of emergency situations. These are general instructions for a healthy lifestyle:    No smoking/ No tobacco products/ Avoid exposure to second hand smoke    Surgeon General's Warning:  Quitting smoking now greatly reduces serious risk to your health. Obesity, smoking, and sedentary lifestyle greatly increases your risk for illness    A healthy diet, regular physical exercise & weight monitoring are important for maintaining a healthy lifestyle    You may be retaining fluid if you have a history of heart failure or if you experience any of the following symptoms:  Weight gain of 3 pounds or more overnight or 5 pounds in a week, increased swelling in our hands or feet or shortness of breath while lying flat in bed.   Please call your doctor as soon as you notice any of these symptoms; do not wait until your next office visit. Recognize signs and symptoms of STROKE:    F-face looks uneven    A-arms unable to move or move unevenly    S-speech slurred or non-existent    T-time-call 911 as soon as signs and symptoms begin-DO NOT go       Back to bed or wait to see if you get better-TIME IS BRAIN.

## 2020-11-24 ENCOUNTER — OFFICE VISIT (OUTPATIENT)
Dept: ORTHOPEDIC SURGERY | Age: 55
End: 2020-11-24
Payer: OTHER GOVERNMENT

## 2020-11-24 VITALS
OXYGEN SATURATION: 98 % | HEART RATE: 92 BPM | SYSTOLIC BLOOD PRESSURE: 111 MMHG | DIASTOLIC BLOOD PRESSURE: 74 MMHG | WEIGHT: 224.2 LBS | TEMPERATURE: 93.9 F | HEIGHT: 68 IN | BODY MASS INDEX: 33.98 KG/M2 | RESPIRATION RATE: 16 BRPM

## 2020-11-24 DIAGNOSIS — S83.231A COMPLEX TEAR OF MEDIAL MENISCUS OF RIGHT KNEE AS CURRENT INJURY, INITIAL ENCOUNTER: Primary | ICD-10-CM

## 2020-11-24 DIAGNOSIS — M17.11 PRIMARY OSTEOARTHRITIS OF RIGHT KNEE: ICD-10-CM

## 2020-11-24 PROCEDURE — 99203 OFFICE O/P NEW LOW 30 MIN: CPT | Performed by: ORTHOPAEDIC SURGERY

## 2020-11-24 NOTE — PROGRESS NOTES
Duarte Can  1965   Chief Complaint   Patient presents with    Knee Pain     right knee pain        HISTORY OF PRESENT ILLNESS  Duarte Can is a 50 y.o. female who presents today for evaluation of right knee pain. She rates her pain 3/10 today. Pain has been present since 9/30/2020. Pt was walking up some stairs when she felt a pop in her knee. This has happened twice. Pt notes swelling after this incident. She has trouble with walking, notes limping. Associated symptom of swelling today. Patient describes the pain as aching, sharp and dull that is Constant in nature. Symptoms are worse with bending, stairs, being immobile for too long, Activity and is better with  Ice, Elevation. Associated symptoms include Swelling. Since problem started, it: is unchanged. Pain does not wake patient up at night. Has taken no meds for the problem. Has tried following treatments: Injections:NO; Brace:NO;  Therapy:NO; Cane/Crutch:NO       Allergies   Allergen Reactions    Acetaminophen Other (comments)    Aspirin Shortness of Breath    Morphine Shortness of Breath    Penicillins Unknown (comments)        Past Medical History:   Diagnosis Date    Endocrine disease     hypothyrodism    Environmental allergies     Hypertension       Social History     Socioeconomic History    Marital status:      Spouse name: Not on file    Number of children: Not on file    Years of education: Not on file    Highest education level: Not on file   Occupational History    Not on file   Social Needs    Financial resource strain: Not on file    Food insecurity     Worry: Not on file     Inability: Not on file    Transportation needs     Medical: Not on file     Non-medical: Not on file   Tobacco Use    Smoking status: Never Smoker    Smokeless tobacco: Never Used   Substance and Sexual Activity    Alcohol use: No    Drug use: No    Sexual activity: Not on file   Lifestyle    Physical activity Days per week: Not on file     Minutes per session: Not on file    Stress: Not on file   Relationships    Social connections     Talks on phone: Not on file     Gets together: Not on file     Attends [employer] service: Not on file     Active member of club or organization: Not on file     Attends meetings of clubs or organizations: Not on file     Relationship status: Not on file    Intimate partner violence     Fear of current or ex partner: Not on file     Emotionally abused: Not on file     Physically abused: Not on file     Forced sexual activity: Not on file   Other Topics Concern     Service Not Asked    Blood Transfusions Not Asked    Caffeine Concern Not Asked    Occupational Exposure Not Asked   [address]  [employer] Hazards Not Asked    Sleep Concern Not Asked    Stress Concern Not Asked    Weight Concern Not Asked    Special Diet Not Asked    Back Care Not Asked    Exercise Not Asked    Bike Helmet Not Asked   [address] Not Asked    Self-Exams Not Asked   Social History Narrative    Not on file      Past Surgical History:   Procedure Laterality Date    HX APPENDECTOMY      HX GYN      hysterectomy      Family History   Problem Relation Age of Onset    No Known Problems Mother     No Known Problems Father       Current Outpatient Medications   Medication Sig    gabapentin (Neurontin) 300 mg capsule Take 1 cap by mouth in the morning and 3 at night as directed.  gabapentin (Neurontin) 300 mg capsule Take 2 Caps by mouth two (2) times a day. Max Daily Amount: 1,200 mg. (Patient taking differently: Take 1,200 mg by mouth nightly. Take 1 cap PO Qam, 1 cap PO Q NOON, and 2 caps PO QPM)    ibuprofen (MOTRIN) 800 mg tablet Take 1 Tab by mouth every eight (8) hours as needed.  lisinopril (PRINIVIL, ZESTRIL) 20 mg tablet Take 1 Tab by mouth daily.  levothyroxine (SYNTHROID) 125 mcg tablet Take 125 mcg by mouth Daily (before breakfast).     traMADoL (ULTRAM) 50 mg tablet Take 50 mg by mouth every six (6) hours as needed.  diazePAM (VALIUM) 2 mg tablet Take 1 Tab by mouth daily as needed.  ergocalciferol (ERGOCALCIFEROL) 50,000 unit capsule Take 50,000 Units by mouth every seven (7) days.  FLUoxetine (PROZAC) 20 mg capsule Take 20 mg by mouth daily.  loratadine (CLARITIN) 10 mg tablet Take 1 Tab by mouth daily. No current facility-administered medications for this visit. REVIEW OF SYSTEM   Patient denies: Weight loss, Fever/Chills, HA, Visual changes, Fatigue, Chest pain, SOB, Abdominal pain, N/V/D/C, Blood in stool or urine, Edema. Pertinent positive as above in HPI. All others were negative    PHYSICAL EXAM:   Visit Vitals  /74 (BP 1 Location: Right arm, BP Patient Position: Sitting)   Pulse 92   Temp (!) 93.9 °F (34.4 °C) (Temporal)   Resp 16   Ht 5' 8\" (1.727 m)   Wt 224 lb 3.2 oz (101.7 kg)   SpO2 98%   BMI 34.09 kg/m²     The patient is a well-developed, well-nourished female   in no acute distress. The patient is alert and oriented times three. The patient is alert and oriented times three. Mood and affect are normal.  LYMPHATIC: lymph nodes are not enlarged and are within normal limits  SKIN: normal in color and non tender to palpation. There are no bruises or abrasions noted. NEUROLOGICAL: Motor sensory exam is within normal limits. Reflexes are equal bilaterally.  There is normal sensation to pinprick and light touch  MUSCULOSKELETAL:  Examination Right knee   Skin Intact   Range of motion 0-130   Effusion +   Medial joint line tenderness +   Lateral joint line tenderness -   Tenderness Pes Bursa -   Tenderness insertion MCL -   Tenderness insertion LCL -   Dedras +   Patella crepitus +   Patella grind +   Lachman -   Pivot shift -   Anterior drawer -   Posterior drawer -   Varus stress -   Valgus stress -   Neurovascular Intact   Calf Swelling and Tenderness to Palpation -   Niya's Test -   Hamstring Cord Tightness -       IMAGING: MRI of right knee from [facility] dated 11/09/2020 was reviewed and read by Dr. Gris Parkinson:   IMPRESSION:  1. Complex tear medial meniscus posterior horn with oblique and radial tear components. Oblique tear minimally extends into the posterior horn/body junction. Small adjacent parameniscal cysts. Overlying, probably related grade 1 MCL abnormality. 2. Large lateral meniscus body and anterior horn oblique tear. 3. Mild distal quadriceps and proximal patellar insertional tendinosis. 4. Mild tricompartmental degenerative osteoarthropathy with asymmetric joint space narrowing and cartilage abnormality. XR of right knee from [facility] dated 10/22/2020 was reviewed and read by Dr. Gris Parkinson: No significant abnormality. IMPRESSION:      ICD-10-CM ICD-9-CM    1. Complex tear of medial meniscus of right knee as current injury, initial encounter  H33.451Q 836.0    2. Primary osteoarthritis of right knee  M17.11 715.16         PLAN:  1. I discussed the risks and benefits and potential adverse outcomes of both operative vs non operative treatment of right knee medial meniscus tear with the patient and patient wishes to proceed with arthroscopic right partial medial meniscectomy. Risks of operative intervention include but not limited to bleeding, infection, deep vein thrombosis, pulmonary embolism, death, limb length discrepancy, reflexive sympathetic dystrophy, fat embolism syndrome,damage to blood vessels and nerves, malunion, non-union, delayed union, failure of hardware, post traumatic arthritis, stroke, heart attack, and death. Patient understands that infection may arise and may require numerous surgeries. The patient was counseled at length about the risks of chitra Covid-19 during their perioperative period and any recovery window from their procedure.   The patient was made aware that chitra Covid-19  may worsen their prognosis for recovering from their procedure and lend to a higher morbidity and/or mortality risk. All material risks, benefits, and reasonable alternatives including postponing the procedure were discussed. The patient does  wish to proceed with the procedure at this time. History and physical exam to be preformed at a later date. Risk factors include: htn, BMI>30, primary OA  2. No ultrasound exam indicated today  3. No cortisone injection indicated today   4. No Physical/Occupational Therapy indicated today  5. No diagnostic test indicated today:   6. No durable medical equipment indicated today  7. No referral indicated today   8. No medications indicated today:   9. No Narcotic indicated today       RTC H&P      Scribed by Yaritza Irene) as dictated by Sony Tilley MD    I, Dr. Sony Tilley, confirm that all documentation is accurate.     Sony Tilley M.D.   [facility]

## 2020-12-16 ENCOUNTER — HOSPITAL ENCOUNTER (OUTPATIENT)
Dept: LAB | Age: 55
Discharge: HOME | End: 2020-12-16
Payer: OTHER GOVERNMENT

## 2020-12-16 DIAGNOSIS — Z01.818 PREOP EXAMINATION: ICD-10-CM

## 2020-12-16 DIAGNOSIS — Z01.818 PREOP EXAMINATION: Primary | ICD-10-CM

## 2020-12-16 LAB
ANION GAP SERPL CALC-SCNC: 3 MMOL/L (ref 3–18)
BASOPHILS # BLD: 0.1 K/UL (ref 0–0.1)
BASOPHILS NFR BLD: 1 % (ref 0–2)
BUN SERPL-MCNC: 20 MG/DL (ref 7–18)
BUN/CREAT SERPL: 18 (ref 12–20)
CALCIUM SERPL-MCNC: 9.7 MG/DL (ref 8.5–10.1)
CHLORIDE SERPL-SCNC: 105 MMOL/L (ref 100–111)
CO2 SERPL-SCNC: 31 MMOL/L (ref 21–32)
CREAT SERPL-MCNC: 1.09 MG/DL (ref 0.6–1.3)
DIFFERENTIAL METHOD BLD: NORMAL
EOSINOPHIL # BLD: 0.3 K/UL (ref 0–0.4)
EOSINOPHIL NFR BLD: 3 % (ref 0–5)
ERYTHROCYTE [DISTWIDTH] IN BLOOD BY AUTOMATED COUNT: 13.3 % (ref 11.6–14.5)
GLUCOSE SERPL-MCNC: 92 MG/DL (ref 74–99)
HCT VFR BLD AUTO: 39.7 % (ref 35–45)
HGB BLD-MCNC: 13.1 G/DL (ref 12–16)
LYMPHOCYTES # BLD: 2.1 K/UL (ref 0.9–3.6)
LYMPHOCYTES NFR BLD: 26 % (ref 21–52)
MCH RBC QN AUTO: 29.1 PG (ref 24–34)
MCHC RBC AUTO-ENTMCNC: 33 G/DL (ref 31–37)
MCV RBC AUTO: 88.2 FL (ref 74–97)
MONOCYTES # BLD: 0.6 K/UL (ref 0.05–1.2)
MONOCYTES NFR BLD: 7 % (ref 3–10)
NEUTS SEG # BLD: 5.2 K/UL (ref 1.8–8)
NEUTS SEG NFR BLD: 63 % (ref 40–73)
PLATELET # BLD AUTO: 283 K/UL (ref 135–420)
PMV BLD AUTO: 10.1 FL (ref 9.2–11.8)
POTASSIUM SERPL-SCNC: 4 MMOL/L (ref 3.5–5.5)
RBC # BLD AUTO: 4.5 M/UL (ref 4.2–5.3)
SODIUM SERPL-SCNC: 139 MMOL/L (ref 136–145)
WBC # BLD AUTO: 8.3 K/UL (ref 4.6–13.2)

## 2020-12-16 PROCEDURE — 93005 ELECTROCARDIOGRAM TRACING: CPT

## 2020-12-16 PROCEDURE — 80048 BASIC METABOLIC PNL TOTAL CA: CPT

## 2020-12-16 PROCEDURE — 36415 COLL VENOUS BLD VENIPUNCTURE: CPT

## 2020-12-16 PROCEDURE — 85025 COMPLETE CBC W/AUTO DIFF WBC: CPT

## 2020-12-17 LAB
ATRIAL RATE: 72 BPM
CALCULATED P AXIS, ECG09: 45 DEGREES
CALCULATED R AXIS, ECG10: 10 DEGREES
CALCULATED T AXIS, ECG11: 16 DEGREES
DIAGNOSIS, 93000: NORMAL
P-R INTERVAL, ECG05: 158 MS
Q-T INTERVAL, ECG07: 398 MS
QRS DURATION, ECG06: 78 MS
QTC CALCULATION (BEZET), ECG08: 435 MS
VENTRICULAR RATE, ECG03: 72 BPM

## 2020-12-22 ENCOUNTER — OFFICE VISIT (OUTPATIENT)
Dept: ORTHOPEDIC SURGERY | Age: 55
End: 2020-12-22

## 2020-12-22 VITALS
HEIGHT: 68 IN | WEIGHT: 223.8 LBS | TEMPERATURE: 97.3 F | SYSTOLIC BLOOD PRESSURE: 128 MMHG | DIASTOLIC BLOOD PRESSURE: 75 MMHG | OXYGEN SATURATION: 99 % | BODY MASS INDEX: 33.92 KG/M2 | RESPIRATION RATE: 16 BRPM | HEART RATE: 92 BPM

## 2020-12-22 DIAGNOSIS — M17.11 PRIMARY OSTEOARTHRITIS OF RIGHT KNEE: ICD-10-CM

## 2020-12-22 DIAGNOSIS — S83.231A COMPLEX TEAR OF MEDIAL MENISCUS OF RIGHT KNEE AS CURRENT INJURY, INITIAL ENCOUNTER: Primary | ICD-10-CM

## 2020-12-22 RX ORDER — OXYCODONE HYDROCHLORIDE 5 MG/1
5 TABLET ORAL
Qty: 40 TAB | Refills: 0 | Status: SHIPPED | OUTPATIENT
Start: 2020-12-22 | End: 2020-12-29

## 2020-12-22 RX ORDER — ONDANSETRON 4 MG/1
4 TABLET, ORALLY DISINTEGRATING ORAL
Qty: 60 TAB | Refills: 2 | Status: SHIPPED | OUTPATIENT
Start: 2020-12-22

## 2020-12-22 NOTE — PATIENT INSTRUCTIONS
Dr. Pepito Chapman What is the surgery? - This is an outpatient procedure at either [facility] or [facility] [address] will be completely asleep for procedure. Dr. Vidhi Liriano will make 2 small incisions in your knee. He will take a tour of your knee with the camera and then address the meniscal tear(s). We will be able to evaluate if any arthritis in your knee but this surgery is not to treat your arthritis. - Total surgery takes about 25-30 mins What can you expect after surgery? - You will have a bulky dressing on your knee that you can remove 2 days after surgery. You will be able to shower 2 days after surgery but no soaking in a bath, hot tub, ocean or pool x 2 weeks to allow for full wound healing. No special brace is needed. - You will be on crutches or a walker when you leave the hospital. You can place weight on your leg as tolerated starting immediately. You are usually on crutches or your walker for about 4-5 days.  
- Even though you can place weight on your leg, we recommend no walking or standing longer than 10mins for the first week. We will gradually increase your activities after that point.   
- Dr. Vidhi Liriano will start physical therapy for you when you return for your 1 week post op apt - It will take your 6-8 weeks to fully recover from your surgery. When can I return to work? - Most patients return to desk work only after 1-2 weeks. We recommend no prolonged walking or standing, climbing, kneeling, or crawling x 6-8 weeks. Not all knee arthroscopies are the same. The specifics of your individual case will be discussed at length with you by Dr. Vidhi Liriano and his Physician Assistant. Juliana Jaramillo Surgical Coordinator [address] [facility] [place]. [place]. [address], [facility] Deontedensilverio [email or web address] 
P: [phone] F: [phone]

## 2020-12-22 NOTE — H&P
HISTORY AND PHYSICAL          Patient: Jareth Troncoso                MRN: [id number]       SSN: [id number]  YOB: 1965          AGE: 50 y.o. SEX: female      Patient scheduled for:  Right knee arthroscopic partial medial menisectomy    Surgeon: Patrick Johnson MD    ANESTHESIA TYPE:  General    HISTORY:     The patient was seen in the office today for a preoperative history and physical for an upcoming above listed surgery. The patient is a pleasant 50 y.o. female who has a history of right knee pain. She rates her pain 3/10 today. Pain has been present since 9/30/2020. Pt was walking up some stairs when she felt a pop in her knee. This has happened twice. Pt notes swelling after this incident. She has trouble with walking, notes limping. Associated symptom of swelling today. Patient describes the pain as aching, sharp and dull that is Constant in nature. Symptoms are worse with bending, stairs, being immobile for too long, Activity and is better with  Ice, Elevation. Associated symptoms include Swelling. Since problem started, it: is unchanged. Pain does not wake patient up at night. Has taken no meds for the problem. Has tried following treatments: Injections:NO; Brace:NO; Therapy:NO; Cane/Crutch:NO       Due to the current findings, affected activity of daily living and continued pain and discomfort, surgical intervention is indicated. The alternatives, risks, and complications, including but not limited to infection, blood loss, need for blood transfusion, neurovascular damage, sandra-incisional numbness, subcutaneous hematoma, bone fracture, anesthetic complications, DVT, PE, death, RSD, postoperative stiffness and pain, possible surgical scar, delayed healing and nonhealing, reflexive sympathetic dystrophy, damage to blood vessels and nerves, need for more surgery, MI, and stroke,  failure of hardware, gait disturbances,have been discussed.   The patient understands and wishes to proceed with surgery. PAST MEDICAL HISTORY:     Past Medical History:   Diagnosis Date    Endocrine disease     hypothyrodism    Environmental allergies     Hypertension        CURRENT MEDICATIONS:     Current Outpatient Medications   Medication Sig Dispense Refill    oxyCODONE IR (ROXICODONE) 5 mg immediate release tablet Take 1 Tab by mouth every four (4) hours as needed for Pain for up to 7 days. Max Daily Amount: 30 mg. DO NOT TAKE UNTIL AFTER SURGERY 40 Tab 0    ondansetron (ZOFRAN ODT) 4 mg disintegrating tablet Take 1 Tab by mouth every eight (8) hours as needed for Nausea or Vomiting. 60 Tab 2    rivaroxaban (Xarelto) 10 mg tablet Take 1 Tab by mouth daily. 14 Tab 0    traMADoL (ULTRAM) 50 mg tablet Take 50 mg by mouth every six (6) hours as needed.  lisinopril (PRINIVIL, ZESTRIL) 20 mg tablet Take 1 Tab by mouth daily.  levothyroxine (SYNTHROID) 125 mcg tablet Take 125 mcg by mouth Daily (before breakfast).  gabapentin (Neurontin) 300 mg capsule Take 1 cap by mouth in the morning and 3 at night as directed. 360 Cap 1    gabapentin (Neurontin) 300 mg capsule Take 2 Caps by mouth two (2) times a day. Max Daily Amount: 1,200 mg. (Patient taking differently: Take 1,200 mg by mouth nightly. Take 1 cap PO Qam, 1 cap PO Q NOON, and 2 caps PO QPM) 360 Cap 1    diazePAM (VALIUM) 2 mg tablet Take 1 Tab by mouth daily as needed.  ergocalciferol (ERGOCALCIFEROL) 50,000 unit capsule Take 50,000 Units by mouth every seven (7) days.  FLUoxetine (PROZAC) 20 mg capsule Take 20 mg by mouth daily.  loratadine (CLARITIN) 10 mg tablet Take 1 Tab by mouth daily.          ALLERGIES:     Allergies   Allergen Reactions    Acetaminophen Other (comments)    Aspirin Shortness of Breath    Morphine Shortness of Breath    Penicillins Unknown (comments)         SURGICAL HISTORY:     Past Surgical History:   Procedure Laterality Date    HX APPENDECTOMY      HX GYN      hysterectomy SOCIAL HISTORY:     Social History     Socioeconomic History    Marital status:      Spouse name: Not on file    Number of children: Not on file    Years of education: Not on file    Highest education level: Not on file   Tobacco Use    Smoking status: Never Smoker    Smokeless tobacco: Never Used   Substance and Sexual Activity    Alcohol use: No    Drug use: No   Other Topics Concern       FAMILY HISTORY:     Family History   Problem Relation Age of Onset    No Known Problems Mother     No Known Problems Father        REVIEW OF SYSTEMS:     Negative for fevers, chills, chest pain, shortness of breath, weight loss, recent illness     General: Negative for fever and chills. No unexpected change in weight. Denies fatigue. No change in appetite. Skin: Negative for rash or itching. HEENT: Negative for congestion, sore throat, neck pain and neck stiffness. No change in vision or hearing. Hasn't noted any enlarged lymph nodes in the neck. Cardiovascular:  Negative for chest pain and palpitations. Has not noted pedal edema. Respiratory: Negative for cough, colds, sinus, hemoptysis, shortness of breath and wheezing. Gastrointestinal: Negative for nausea and vomiting, rectal bleeding, coffee ground emesis, abdominal pain, diarrhea and constipation. Genitourinary: Negative for dysuria, frequency urgency, or burning on micturition. No flank pain, no foul smelling urine, no difficulty with initiating urination. Hematological: Negative for bleeding or easy bruising. Musculoskeletal: Negative  for arthralgias, back pain or neck pain. Neurological: Negative for dizziness, seizures or syncopal episodes. Denies headaches. Endocrine: Denies excessive thirst.  No heat/cold intolerance. Psychiatric: Negative for depression or insomnia.     PHYSICAL EXAMINATION:     VITALS:   Visit Vitals  /75 (BP 1 Location: Right arm, BP Patient Position: Sitting)   Pulse 92   Temp 97.3 °F (36.3 °C) (Temporal)   Resp 16   Ht 5' 8\" (1.727 m)   Wt 223 lb 12.8 oz (101.5 kg)   SpO2 99%   BMI 34.03 kg/m²     GEN:  Well developed, well nourished 50 y.o. female in no acute distress. HEENT: Normocephalic and atraumatic. Eyes: Conjunctivae and EOM are normal.Pupils are equal, round, and reactive to light. External ear normal appearance, external nose normal appearing. Mouth/Throat: Oropharynx is clear and moist, able to handle oral secretions w/out difficulty, airway patent  NECK: Supple. Normal ROM, No lymphadenopathy. Trachea is midline. No bruising, swelling or deformity  RESP: Clear to auscultation bilaterally. No wheezes, rales, rhonchi. Normal effort and breath sounds. No respiratory distress  CARDIO:  Normal rate, regular rhythm and normal heart sounds. No MGR. ABDOMEN: Soft, non-tender, non-distended, normoactive bowel sounds in all four quadrants. There is no tenderness. There is no rebound and no guarding. BACK: No CVA or spinal tenderness  BREAST:  Deferred  PELVIC:    Deferred   RECTAL:  Deferred   :           Deferred  EXTREMITIES: EXAMINATION OF: right knee  Examination Right knee   Skin Intact   Range of motion 0-130   Effusion +   Medial joint line tenderness +   Lateral joint line tenderness -   Tenderness Pes Bursa -   Tenderness insertion MCL -   Tenderness insertion LCL -   Dedras +   Patella crepitus +   Patella grind +   Lachman -   Pivot shift -   Anterior drawer -   Posterior drawer -   Varus stress -   Valgus stress -   Neurovascular Intact   Calf Swelling and Tenderness to Palpation -   Niya's Test -   Hamstring Cord Tightness -        NEUROVASCULAR: Sensation intact to light touch and strength grossly intact and symmetrical. No nystagmus. Positive distal pulses and capillary refill. DVT ASSESSMENT:  There is not  calf tenderness. No evidence of DVT seen on physical exam.  MOTOR: In tact  PSYCH: Alert an oriented to person, place and time.  Mood, memory, affect, behavior and judgment normal       RADIOGRAPHS & DIAGNOSTIC STUDIES:     MRI/xray reveals :   IMAGING: MRI of right knee from [facility] dated 11/09/2020 was reviewed and read by Dr. Nevaeh Faria:   IMPRESSION:  1. Complex tear medial meniscus posterior horn with oblique and radial tear components. Oblique tear minimally extends into the posterior horn/body junction. Small adjacent parameniscal cysts. Overlying, probably related grade 1 MCL abnormality. 2. Large lateral meniscus body and anterior horn oblique tear. 3. Mild distal quadriceps and proximal patellar insertional tendinosis. 4. Mild tricompartmental degenerative osteoarthropathy with asymmetric joint space narrowing and cartilage abnormality.           XR of right knee from [facility] dated 10/22/2020 was reviewed and read by Dr. Nevaeh Faria: No significant abnormality.           LABS:       @  CBC:   Lab Results   Component Value Date/Time    WBC 8.3 12/16/2020 03:39 PM    RBC 4.50 12/16/2020 03:39 PM    HGB 13.1 12/16/2020 03:39 PM    HCT 39.7 12/16/2020 03:39 PM    PLATELET 360 34/30/1424 03:39 PM    and BMP:   Lab Results   Component Value Date/Time    Glucose 92 12/16/2020 03:39 PM    Sodium 139 12/16/2020 03:39 PM    Potassium 4.0 12/16/2020 03:39 PM    Chloride 105 12/16/2020 03:39 PM    CO2 31 12/16/2020 03:39 PM    BUN 20 (H) 12/16/2020 03:39 PM    Creatinine 1.09 12/16/2020 03:39 PM    Calcium 9.7 12/16/2020 03:39 PM   @    Preoperative labs were reviewed and are substantially within normal limits   EKG: scanned in chart. ASSESSMENT:       Encounter Diagnoses   Name Primary?  Complex tear of medial meniscus of right knee as current injury, initial encounter Yes    Primary osteoarthritis of right knee        PLAN:     Again, the alternatives, risks, and complications, as well as expected outcome were discussed. The patient understands and agrees to proceed with right knee arthroscopic partial medial and lateral menisectomy.  The patient was counseled at length about the risks of chitra Covid-19 during their perioperative period and any recovery window from their procedure. The patient was made aware that chitra Covid-19  may worsen their prognosis for recovering from their procedure and lend to a higher morbidity and/or mortality risk. All material risks, benefits, and reasonable alternatives including postponing the procedure were discussed. The patient does  wish to proceed with the procedure at this time.    Patient given orders listed below:    Orders Placed This Encounter    oxyCODONE IR (ROXICODONE) 5 mg immediate release tablet    ondansetron (ZOFRAN ODT) 4 mg disintegrating tablet    rivaroxaban (Xarelto) 10 mg tablet         Amaris Yo PA-C  12/22/2020  3:51 PM

## 2020-12-23 DIAGNOSIS — Z01.818 PREOP EXAMINATION: Primary | ICD-10-CM

## 2020-12-24 ENCOUNTER — HOSPITAL ENCOUNTER (OUTPATIENT)
Dept: PREADMISSION TESTING | Age: 55
Discharge: HOME | End: 2020-12-24
Payer: OTHER GOVERNMENT

## 2020-12-24 DIAGNOSIS — Z01.818 PREOP EXAMINATION: ICD-10-CM

## 2020-12-24 PROCEDURE — 87635 SARS-COV-2 COVID-19 AMP PRB: CPT

## 2020-12-25 LAB — SARS-COV-2, COV2NT: NOT DETECTED

## 2020-12-28 RX ORDER — METOCLOPRAMIDE 10 MG/1
10 TABLET ORAL
COMMUNITY

## 2020-12-28 RX ORDER — RIZATRIPTAN BENZOATE 5 MG/1
TABLET, ORALLY DISINTEGRATING ORAL AS NEEDED
COMMUNITY

## 2020-12-29 ENCOUNTER — ANESTHESIA EVENT (OUTPATIENT)
Dept: SURGERY | Age: 55
End: 2020-12-29
Payer: OTHER GOVERNMENT

## 2020-12-30 ENCOUNTER — ANESTHESIA (OUTPATIENT)
Dept: SURGERY | Age: 55
End: 2020-12-30
Payer: OTHER GOVERNMENT

## 2020-12-30 ENCOUNTER — TELEPHONE (OUTPATIENT)
Dept: ORTHOPEDIC SURGERY | Age: 55
End: 2020-12-30

## 2020-12-30 ENCOUNTER — HOSPITAL ENCOUNTER (OUTPATIENT)
Age: 55
Setting detail: OUTPATIENT SURGERY
Discharge: HOME | End: 2020-12-30
Attending: ORTHOPAEDIC SURGERY | Admitting: ORTHOPAEDIC SURGERY
Payer: OTHER GOVERNMENT

## 2020-12-30 VITALS
HEART RATE: 77 BPM | HEIGHT: 68 IN | RESPIRATION RATE: 20 BRPM | DIASTOLIC BLOOD PRESSURE: 84 MMHG | BODY MASS INDEX: 33.7 KG/M2 | TEMPERATURE: 97 F | SYSTOLIC BLOOD PRESSURE: 126 MMHG | OXYGEN SATURATION: 98 % | WEIGHT: 222.38 LBS

## 2020-12-30 DIAGNOSIS — S83.271D COMPLEX TEAR OF LATERAL MENISCUS OF RIGHT KNEE AS CURRENT INJURY, SUBSEQUENT ENCOUNTER: ICD-10-CM

## 2020-12-30 DIAGNOSIS — S83.231D COMPLEX TEAR OF MEDIAL MENISCUS OF RIGHT KNEE AS CURRENT INJURY, SUBSEQUENT ENCOUNTER: ICD-10-CM

## 2020-12-30 PROCEDURE — 76060000033 HC ANESTHESIA 1 TO 1.5 HR: Performed by: ORTHOPAEDIC SURGERY

## 2020-12-30 PROCEDURE — 77030033005 HC TBNG ARTHSC PMP STRY -B: Performed by: ORTHOPAEDIC SURGERY

## 2020-12-30 PROCEDURE — 77030022036 HC BLD SHV TOMCAT STRY -B: Performed by: ORTHOPAEDIC SURGERY

## 2020-12-30 PROCEDURE — 77030039266 HC ADH SKN EXOFIN S2SG -A: Performed by: ORTHOPAEDIC SURGERY

## 2020-12-30 PROCEDURE — 74011250636 HC RX REV CODE- 250/636: Performed by: PHYSICIAN ASSISTANT

## 2020-12-30 PROCEDURE — 77030040361 HC SLV COMPR DVT MDII -B: Performed by: ORTHOPAEDIC SURGERY

## 2020-12-30 PROCEDURE — 74011250636 HC RX REV CODE- 250/636: Performed by: NURSE ANESTHETIST, CERTIFIED REGISTERED

## 2020-12-30 PROCEDURE — 74011250637 HC RX REV CODE- 250/637: Performed by: NURSE ANESTHETIST, CERTIFIED REGISTERED

## 2020-12-30 PROCEDURE — 74011000250 HC RX REV CODE- 250: Performed by: ORTHOPAEDIC SURGERY

## 2020-12-30 PROCEDURE — 29880 ARTHRS KNE SRG MNISECTMY M&L: CPT | Performed by: ORTHOPAEDIC SURGERY

## 2020-12-30 PROCEDURE — 77030002933 HC SUT MCRYL J&J -A: Performed by: ORTHOPAEDIC SURGERY

## 2020-12-30 PROCEDURE — 77030000032 HC CUF TRNQT ZIMM -B: Performed by: ORTHOPAEDIC SURGERY

## 2020-12-30 PROCEDURE — 74011000250 HC RX REV CODE- 250: Performed by: ANESTHESIOLOGY

## 2020-12-30 PROCEDURE — 76210000063 HC OR PH I REC FIRST 0.5 HR: Performed by: ORTHOPAEDIC SURGERY

## 2020-12-30 PROCEDURE — 74011250637 HC RX REV CODE- 250/637: Performed by: ORTHOPAEDIC SURGERY

## 2020-12-30 PROCEDURE — 01400 ANES OPN/ARTHRS KNEE JT NOS: CPT | Performed by: ANESTHESIOLOGY

## 2020-12-30 PROCEDURE — 2709999900 HC NON-CHARGEABLE SUPPLY: Performed by: ORTHOPAEDIC SURGERY

## 2020-12-30 PROCEDURE — 76210000026 HC REC RM PH II 1 TO 1.5 HR: Performed by: ORTHOPAEDIC SURGERY

## 2020-12-30 PROCEDURE — 76010000149 HC OR TIME 1 TO 1.5 HR: Performed by: ORTHOPAEDIC SURGERY

## 2020-12-30 PROCEDURE — 74011250636 HC RX REV CODE- 250/636: Performed by: ANESTHESIOLOGY

## 2020-12-30 PROCEDURE — 77030040922 HC BLNKT HYPOTHRM STRY -A: Performed by: ORTHOPAEDIC SURGERY

## 2020-12-30 RX ORDER — EPHEDRINE SULFATE/0.9% NACL/PF 50 MG/5 ML
SYRINGE (ML) INTRAVENOUS AS NEEDED
Status: DISCONTINUED | OUTPATIENT
Start: 2020-12-30 | End: 2020-12-30 | Stop reason: HOSPADM

## 2020-12-30 RX ORDER — OXYCODONE HYDROCHLORIDE 5 MG/1
5 TABLET ORAL
Status: COMPLETED | OUTPATIENT
Start: 2020-12-30 | End: 2020-12-30

## 2020-12-30 RX ORDER — SODIUM CHLORIDE, SODIUM LACTATE, POTASSIUM CHLORIDE, CALCIUM CHLORIDE 600; 310; 30; 20 MG/100ML; MG/100ML; MG/100ML; MG/100ML
50 INJECTION, SOLUTION INTRAVENOUS CONTINUOUS
Status: DISCONTINUED | OUTPATIENT
Start: 2020-12-30 | End: 2020-12-30 | Stop reason: HOSPADM

## 2020-12-30 RX ORDER — DEXAMETHASONE SODIUM PHOSPHATE 4 MG/ML
INJECTION, SOLUTION INTRA-ARTICULAR; INTRALESIONAL; INTRAMUSCULAR; INTRAVENOUS; SOFT TISSUE AS NEEDED
Status: DISCONTINUED | OUTPATIENT
Start: 2020-12-30 | End: 2020-12-30 | Stop reason: HOSPADM

## 2020-12-30 RX ORDER — LIDOCAINE HYDROCHLORIDE 10 MG/ML
0.1 INJECTION, SOLUTION EPIDURAL; INFILTRATION; INTRACAUDAL; PERINEURAL AS NEEDED
Status: DISCONTINUED | OUTPATIENT
Start: 2020-12-30 | End: 2020-12-30 | Stop reason: HOSPADM

## 2020-12-30 RX ORDER — LIDOCAINE HYDROCHLORIDE 20 MG/ML
INJECTION, SOLUTION EPIDURAL; INFILTRATION; INTRACAUDAL; PERINEURAL AS NEEDED
Status: DISCONTINUED | OUTPATIENT
Start: 2020-12-30 | End: 2020-12-30 | Stop reason: HOSPADM

## 2020-12-30 RX ORDER — MIDAZOLAM HYDROCHLORIDE 1 MG/ML
INJECTION, SOLUTION INTRAMUSCULAR; INTRAVENOUS AS NEEDED
Status: DISCONTINUED | OUTPATIENT
Start: 2020-12-30 | End: 2020-12-30 | Stop reason: HOSPADM

## 2020-12-30 RX ORDER — FAMOTIDINE 20 MG/1
20 TABLET, FILM COATED ORAL ONCE
Status: COMPLETED | OUTPATIENT
Start: 2020-12-30 | End: 2020-12-30

## 2020-12-30 RX ORDER — PROPOFOL 10 MG/ML
INJECTION, EMULSION INTRAVENOUS AS NEEDED
Status: DISCONTINUED | OUTPATIENT
Start: 2020-12-30 | End: 2020-12-30 | Stop reason: HOSPADM

## 2020-12-30 RX ORDER — ONDANSETRON 2 MG/ML
INJECTION INTRAMUSCULAR; INTRAVENOUS AS NEEDED
Status: DISCONTINUED | OUTPATIENT
Start: 2020-12-30 | End: 2020-12-30 | Stop reason: HOSPADM

## 2020-12-30 RX ORDER — BUPIVACAINE HYDROCHLORIDE 5 MG/ML
INJECTION, SOLUTION EPIDURAL; INTRACAUDAL AS NEEDED
Status: DISCONTINUED | OUTPATIENT
Start: 2020-12-30 | End: 2020-12-30 | Stop reason: HOSPADM

## 2020-12-30 RX ORDER — FENTANYL CITRATE 50 UG/ML
INJECTION, SOLUTION INTRAMUSCULAR; INTRAVENOUS AS NEEDED
Status: DISCONTINUED | OUTPATIENT
Start: 2020-12-30 | End: 2020-12-30 | Stop reason: HOSPADM

## 2020-12-30 RX ORDER — KETOROLAC TROMETHAMINE 15 MG/ML
INJECTION, SOLUTION INTRAMUSCULAR; INTRAVENOUS AS NEEDED
Status: DISCONTINUED | OUTPATIENT
Start: 2020-12-30 | End: 2020-12-30 | Stop reason: HOSPADM

## 2020-12-30 RX ORDER — PHENYLEPHRINE HCL IN 0.9% NACL 1 MG/10 ML
SYRINGE (ML) INTRAVENOUS AS NEEDED
Status: DISCONTINUED | OUTPATIENT
Start: 2020-12-30 | End: 2020-12-30 | Stop reason: HOSPADM

## 2020-12-30 RX ADMIN — FENTANYL CITRATE 25 MCG: 50 INJECTION, SOLUTION INTRAMUSCULAR; INTRAVENOUS at 11:22

## 2020-12-30 RX ADMIN — SODIUM CHLORIDE, SODIUM LACTATE, POTASSIUM CHLORIDE, AND CALCIUM CHLORIDE 50 ML/HR: 600; 310; 30; 20 INJECTION, SOLUTION INTRAVENOUS at 08:58

## 2020-12-30 RX ADMIN — LIDOCAINE HYDROCHLORIDE 100 MG: 20 INJECTION, SOLUTION EPIDURAL; INFILTRATION; INTRACAUDAL; PERINEURAL at 10:37

## 2020-12-30 RX ADMIN — FAMOTIDINE 20 MG: 20 TABLET, FILM COATED ORAL at 09:07

## 2020-12-30 RX ADMIN — MIDAZOLAM HYDROCHLORIDE 2 MG: 2 INJECTION, SOLUTION INTRAMUSCULAR; INTRAVENOUS at 10:30

## 2020-12-30 RX ADMIN — Medication 50 MCG: at 10:58

## 2020-12-30 RX ADMIN — OXYCODONE 5 MG: 5 TABLET ORAL at 13:07

## 2020-12-30 RX ADMIN — Medication 10 MG: at 11:20

## 2020-12-30 RX ADMIN — Medication 100 MCG: at 10:55

## 2020-12-30 RX ADMIN — VANCOMYCIN HYDROCHLORIDE 1000 MG: 1 INJECTION, POWDER, LYOPHILIZED, FOR SOLUTION INTRAVENOUS at 09:52

## 2020-12-30 RX ADMIN — FENTANYL CITRATE 25 MCG: 50 INJECTION, SOLUTION INTRAMUSCULAR; INTRAVENOUS at 10:37

## 2020-12-30 RX ADMIN — DEXAMETHASONE SODIUM PHOSPHATE 4 MG: 4 INJECTION, SOLUTION INTRAMUSCULAR; INTRAVENOUS at 10:48

## 2020-12-30 RX ADMIN — ONDANSETRON 4 MG: 2 INJECTION INTRAMUSCULAR; INTRAVENOUS at 10:48

## 2020-12-30 RX ADMIN — PROPOFOL 200 MG: 10 INJECTION, EMULSION INTRAVENOUS at 10:37

## 2020-12-30 RX ADMIN — VANCOMYCIN HYDROCHLORIDE: 1 INJECTION, POWDER, LYOPHILIZED, FOR SOLUTION INTRAVENOUS at 10:49

## 2020-12-30 RX ADMIN — FENTANYL CITRATE 50 MCG: 50 INJECTION, SOLUTION INTRAMUSCULAR; INTRAVENOUS at 11:02

## 2020-12-30 RX ADMIN — FENTANYL CITRATE 100 MCG: 50 INJECTION, SOLUTION INTRAMUSCULAR; INTRAVENOUS at 10:30

## 2020-12-30 RX ADMIN — KETOROLAC TROMETHAMINE 15 MG: 15 INJECTION, SOLUTION INTRAMUSCULAR; INTRAVENOUS at 11:29

## 2020-12-30 RX ADMIN — SODIUM CHLORIDE, SODIUM LACTATE, POTASSIUM CHLORIDE, AND CALCIUM CHLORIDE: 600; 310; 30; 20 INJECTION, SOLUTION INTRAVENOUS at 10:26

## 2020-12-30 NOTE — OP NOTES
Operative Report    Patient: Johny Donaldson MRN: [id number]  SSN: [id number]    YOB: 1965  Age: 50 y.o. Sex: female       Date of Surgery: 12/30/2020     Preoperative Diagnosis: Complex tear of medial meniscus of right knee as current injury, initial encounter [S83.231A]  Primary osteoarthritis of right knee [M17.11]     Postoperative Diagnosis medial lateral meniscus tear right knee generative arthritis right knee    Surgeon(s) and Role:     Zohaib Ruiz MD - Primary    Anesthesia: General     Procedure: Procedure(s):  RIGHT KNEE ARTHROSCOPIC PARTIAL MEDIAL AND LATERAL MENISCECTOMY     Procedure in Detail: Patient was taken to the operating room induced under general trach anesthesia with anesthesia staff. Placed in standard arthroscopy cortes the right leg was prepped with ChloraPrep solution draped as a free sterile field. Anterolateral portal was used as an arthroscopy portal anteromedial portals were portal with portals made with 11 blade followed by a short blunt trochars. Once the arthroscope was in place the knee was systematically evaluated sinus suprapatellar pouch patellofemoral joint where diffuse degenerative changes were noted. The medial compartment complex tear of the posterior third of the medial meniscus was found was the using a straight basket forceps 3 5 shaver leaving a stable rim. Diffuse degenerative changes noted in the medial compartment. The lateral compartment horizontal cleavage tear in the anterior middle horn of the lateral meniscus was found was debrided using straight basket forceps 3 5 shaver leaving a stable rim. Anterior cruciate ligament was intact. Medial lateral gutters were intact. Fluid was suctioned out and the knee was injected with Marcaine. portals were closed with 4-0 Monocryl. Sterile dressings were applied patient patient tolerated procedure well and was taken to recovery room without problems.       Estimated Blood Loss: Minimal    Tourniquet Time: * Missing tourniquet times found for documented tourniquets in lo *      Implants: * No implants in log *            Specimens: * No specimens in log *        Drains: None                Complications: None    Counts: Sponge and needle counts were correct times two.     Signed By:  Pepper Mobley MD     2020

## 2020-12-30 NOTE — TELEPHONE ENCOUNTER
Patient would like a work note excusing her  from work next week January 4 through 8, 2021, due to her surgery she  had today. Please call her when ready.

## 2020-12-30 NOTE — ANESTHESIA POSTPROCEDURE EVALUATION
Procedure(s):  RIGHT KNEE ARTHROSCOPIC PARTIAL MEDIAL MENISCECTOMY. general    Anesthesia Post Evaluation      Multimodal analgesia: multimodal analgesia used between 6 hours prior to anesthesia start to PACU discharge  Patient location during evaluation: bedside  Patient participation: complete - patient cannot participate  Level of consciousness: awake  Pain score: 1  Pain management: adequate  Airway patency: patent  Anesthetic complications: no  Cardiovascular status: acceptable  Respiratory status: acceptable  Hydration status: acceptable  Post anesthesia nausea and vomiting:  none      INITIAL Post-op Vital signs:   Vitals Value Taken Time   /85 12/30/20 1217   Temp 36.8 °C (98.3 °F) 12/30/20 1150   Pulse 75 12/30/20 1224   Resp 16 12/30/20 1224   SpO2 97 % 12/30/20 1224   Vitals shown include unvalidated device data.

## 2020-12-30 NOTE — DISCHARGE INSTRUCTIONS
Dr. Stephens Other Knee Arthroscopy Surgery  Patient Education      Patient to restart Xarelto 10mg today  Weight bearing as tolerated              Meniscus Surgery: What to Expect at Home  Your Recovery  Meniscus surgery removes or fixes the cartilage (meniscus) between the bones in the knee. Each knee has two of these rubbery pads of cartilage, one on either side. Meniscus repair is usually done with arthroscopic surgery. Your doctor put a lighted tube--called an arthroscope or scope--and other surgical tools through small cuts (incisions) in your knee. The incisions leave scars that usually fade in time. You will feel tired for several days. Your knee will be swollen. And you may have numbness around the cuts the doctor made (incisions) on your knee. You can put ice on the knee to reduce swelling. Most of this will go away in a few days. You should soon start seeing improvement in your knee. You may be able to return to most of your regular activities within a few weeks. But it will be several months before you have complete use of your knee. It may take as long as 6 months before your knee is strong enough for hard physical work or certain sports. You will need to build your strength and the motion of your joint with rehabilitation (rehab) exercises. In time, your knee will likely be stronger and more stable than it was before the surgery. How soon you can return to sports or exercise depends on how well you follow your rehab program and how well your knee heals. Your doctor or physical therapist will give you an idea of when you can return to these activities. If you had a partial meniscectomy, you might be able to play sports in about 4 to 6 weeks. If you had meniscus repair, it may be 3 to 6 months before you can play sports. This care sheet gives you a general idea about how long it will take for you to recover. But each person recovers at a different pace.  Follow the steps below to get better as quickly as possible. How can you care for yourself at home? Activity    · Rest when you feel tired. Getting enough sleep will help you recover. Sleep with your knee raised, but not bent. Put a pillow under your foot.     · Keep your leg raised as much as possible for the first few days.     · You may shower 24 to 48 hours after surgery, if your doctor okays it. When you shower, keep your bandage and incisions dry by taping a sheet of plastic to cover them. If you have a brace, take it off if your doctor says it is okay. It might help to sit on a shower stool.     · You will be able to stand if you have a brace or use crutches. Do not put weight on your leg until your doctor says you can. You can move around the house to do daily tasks.     · If you have a brace, leave it on except when you exercise your knee or you shower. Be careful not to put the brace on too tight. You will use it for about 2 to 6 weeks.     · If your doctor does not want you to shower or remove your brace, you can take a sponge bath.     · Wait 2 weeks or until your doctor says it is okay before you take a bath, swim, use a hot tub, or soak your leg.     · You can drive when you are no longer using crutches or a knee brace, are no longer taking prescription pain medicine, and have some control over your knee. This usually takes 1 to 2 weeks.     · How soon you can return to work depends on your job. If you sit at work, you may be able to go back in 1 to 2 weeks. But if you are on your feet at work, it may take 4 to 6 weeks. If you are very physically active in your job, it may take 3 to 6 months. Diet    · You can eat your normal diet. If your stomach is upset, try bland, low-fat foods like plain rice, broiled chicken, toast, and yogurt. Drink plenty of fluids.     · You may notice that your bowel movements are not regular right after your surgery. This is common. Try to avoid constipation and straining with bowel movements.  You may want to take a fiber supplement every day. If you have not had a bowel movement after a couple of days, ask your doctor about taking a mild laxative. Medicines    · Your doctor will tell you if and when you can restart your medicines. He or she will also give you instructions about taking any new medicines.     · If you take aspirin or some other blood thinner, ask your doctor if and when to start taking it again. Make sure that you understand exactly what your doctor wants you to do.     · Be safe with medicines. Take pain medicines exactly as directed. ? If the doctor gave you a prescription medicine for pain, take it as prescribed. ? If you are not taking a prescription pain medicine, ask your doctor if you can take an over-the-counter medicine.     · If your doctor prescribed antibiotics, take them as directed. Do not stop taking them just because you feel better. You need to take the full course of antibiotics.     · If you think your pain medicine is making you sick to your stomach:  ? Take your medicine after meals (unless your doctor has told you not to). ? Ask your doctor for a different pain medicine. Incision care    · If you have a bandage over your incisions, keep the bandage clean and dry. Follow your doctor's instructions. Some doctors want to see you before you take it off, while others may let you take it off 48 to 72 hours after your surgery.     · If you have strips of tape on the incisions, leave the tape on for a week or until it falls off. Keep the area clean and dry. Exercise    · Rehab exercises are an important part of your treatment. Your first exercises will help you improve your knee's movement and regain your muscle strength. Ice and elevation    · To reduce swelling and pain, put ice or a cold pack on your knee for 10 to 20 minutes at a time. Do this every few hours.  Put a thin cloth between the ice and your skin.     · For 3 days after surgery, prop up the sore leg on a pillow when you ice it or anytime you sit or lie down. Try to keep it above the level of your heart. This will help reduce swelling.     · If your doctor gave you support stockings, wear them as long as he or she tells you to. These help prevent blood clots. Follow-up care is a key part of your treatment and safety. Be sure to make and go to all appointments, and call your doctor if you are having problems. It's also a good idea to know your test results and keep a list of the medicines you take. When should you call for help? Call 911 anytime you think you may need emergency care. For example, call if:    · You passed out (lost consciousness).     · You have severe trouble breathing.     · You have sudden chest pain and shortness of breath, or you cough up blood. Call your doctor now or seek immediate medical care if:    · You have pain that does not go away after you take pain medicine.     · You have loose stitches, or your incisions come open.     · Bright red blood has soaked through the bandage over your incision.     · You have signs of infection, such as:  ? Increased pain, swelling, warmth, or redness. ? Red streaks leading from the incision. ? Pus draining from the incision. ? Swollen lymph nodes in your neck, armpits, or groin. ? A fever.     · You have signs of a blood clot, such as:  ? Pain in your calf, back of the knee, thigh, or groin. ? Redness and swelling in your leg or groin. Watch closely for any changes in your health, and be sure to contact your doctor if:    · You feel a catching or locking in your knee.     · You are sick to your stomach or cannot keep fluids down.     · You have swelling, tingling, pain, or numbness in your toes that does not go away when you raise your knee above the level of your heart.     · You do not have a bowel movement after taking a laxative. Where can you learn more?   Go to http://www.gray.com/  Enter H324 in the search box to learn more about \"Meniscus Surgery: What to Expect at Home. \"  Current as of: March 2, 2020               Content Version: 12.6  © 2006-2020 [employer]. Care instructions adapted under license by [employer] (which disclaims liability or warranty for this information). If you have questions about a medical condition or this instruction, always ask your healthcare professional. [address] any warranty or liability for your use of this information. What is the surgery? - This is an outpatient procedure at either [place] [address] or [address] [place] [address] Rd will be completely asleep for procedure. Dr. Elizabeth Stanton will make 2 small incisions in your knee. He will take a tour of your knee with the camera and then address the meniscal tear(s). We will be able to evaluate if any arthritis in your knee but this surgery is not to treat your arthritis. - Total surgery takes about 25-30 mins     What can you expect after surgery? - You will have a bulky dressing on your knee that you can remove 2 days after surgery. You will be able to shower 2 days after surgery but no soaking in a bath, hot tub, ocean or pool x 2 weeks to allow for full wound healing. No special brace is needed. - You will be on crutches or a walker when you leave the hospital. You can place weight on your leg as tolerated starting immediately. You are usually on crutches or your walker for about 4-5 days.   - Even though you can place weight on your leg, we recommend no walking or standing longer than 10mins for the first week. We will gradually increase your activities after that point.    - Dr. Elizabeth Stanton will start physical therapy for you when you return for your 1 week post op apt  - It will take your 6-8 weeks to fully recover from your surgery. When can I return to work? - Most patients return to desk work only after 1-2 weeks.  We recommend no prolonged walking or standing, climbing, kneeling, or crawling x 6-8 weeks. Not all knee arthroscopies are the same. The specifics of your individual case will be discussed at length with you by Dr. Lily Henriquez and his Physician Assistant. Marilee Rabago  Surgical Coordinator  [address] [place], [place] [address]aashish. [place] [address] Yanijena Boo  [email or web address]  P: [phone]  F: [phone]

## 2020-12-30 NOTE — BRIEF OP NOTE
Brief Postoperative Note    Patient: Sarita Hubbard  YOB: 1965  MRN: [id number]    Date of Procedure: 12/30/2020     Pre-Op Diagnosis: Complex tear of medial meniscus of right knee as current injury, initial encounter [S83.231A]  Primary osteoarthritis of right knee [M17.11]    Post-Op Diagnosis: Medial lateral meniscus tear right knee      Procedure(s):  RIGHT KNEE ARTHROSCOPIC PARTIAL MEDIAL LATERAL MENISCECTOMY    Surgeon(s):  Arjun Edwards MD    Surgical Assistant: Surg Asst-1: Tad Johnson    Anesthesia: General     Estimated Blood Loss (mL): Minimal    Complications: None    Specimens: * No specimens in log *     Implants: * No implants in log *    Drains: * No LDAs found *    Findings: As above    Electronically Signed by Tushar Fried MD on 12/30/2020 at 11:27 AM

## 2020-12-30 NOTE — H&P
Date of Surgery Update:  Darren Abraham was seen and examined. History and physical has been reviewed. The patient has been examined. There have been no significant clinical changes since the completion of the originally dated History and Physical.    Signed By: Magdaleno Thomas MD     December 30, 2020 10:00 AM       The above patient was independently seen and examined by myself. The case was then discussed and a proper diagnosis/plan was made. I agree with the above assessment.

## 2020-12-30 NOTE — ANESTHESIA PREPROCEDURE EVALUATION
Relevant Problems   No relevant active problems       Anesthetic History   No history of anesthetic complications            Review of Systems / Medical History  Patient summary reviewed, nursing notes reviewed and pertinent labs reviewed    Pulmonary  Within defined limits                 Neuro/Psych   Within defined limits           Cardiovascular    Hypertension: well controlled                   GI/Hepatic/Renal  Within defined limits              Endo/Other  Within defined limits           Other Findings              Physical Exam    Airway  Mallampati: II  TM Distance: 4 - 6 cm  Neck ROM: normal range of motion   Mouth opening: Normal     Cardiovascular  Regular rate and rhythm,  S1 and S2 normal,  no murmur, click, rub, or gallop  Rhythm: regular  Rate: normal         Dental  No notable dental hx       Pulmonary  Breath sounds clear to auscultation               Abdominal  GI exam deferred       Other Findings            Anesthetic Plan    ASA: 2  Anesthesia type: general          Induction: Intravenous  Anesthetic plan and risks discussed with: Patient

## 2020-12-30 NOTE — LETTER
NOTIFICATION RETURN TO WORK / SCHOOL 
 
12/31/2020 8:46 AM 
 
Ms. Sarahi Croft [address] [place] [id number] To Whom It May Concern: 
 
Sarahi Croft is currently under the care of [address] [place] [address] Jacinto Man. She will be out of work starting 1/4/21 through 1/8/21. If there are questions or concerns please have the patient contact our office. Sincerely, Pb Low MD

## 2020-12-31 NOTE — TELEPHONE ENCOUNTER
Note written and left a voicemail on patients personal machine letting her know that it was ready for

## 2021-01-06 ENCOUNTER — OFFICE VISIT (OUTPATIENT)
Dept: ORTHOPEDIC SURGERY | Age: 56
End: 2021-01-06
Payer: OTHER GOVERNMENT

## 2021-01-06 DIAGNOSIS — S83.231A COMPLEX TEAR OF MEDIAL MENISCUS OF RIGHT KNEE AS CURRENT INJURY, INITIAL ENCOUNTER: Primary | ICD-10-CM

## 2021-01-06 PROCEDURE — 99024 POSTOP FOLLOW-UP VISIT: CPT | Performed by: PHYSICIAN ASSISTANT

## 2021-01-06 NOTE — PROGRESS NOTES
Patient: Eulalia Abad  YOB: 1965       HISTORY:  The patient presents for reevaluation of her right knee status post arthroscopic partial medial menisectomy with grade III-IV chondromalacia on 12/30/2020. Patient is improved, states pain is a 6 out of 10.  she has not gone to physical therapy. Patient denies any fever, chills, chest pain, shortness of breath or calf pain. The remainder of the review of systems is negative. There are no new illness or injuries to report since last seen in the office. No changes in medications, allergies, social or family history. PHYSICAL EXAMINATION:    There were no vitals taken for this visit. The patient is a well-developed, well-nourished female in no acute distress. The patient is alert and oriented times three. The patient appears to be well groomed. Mood and affect are normal.   ORTHOPEDIC EXAM of Right knee: Inspection: Effusion present,  incisions clean, dry intact, sutures in place  TTP: medial joint line  Range of motion: 0-85 flexion  Stability: Stable  Strength: 5/5  2+ distal pulses    IMPRESSION:  Status post Right knee arthroscopic partial medial menisectomy with diffuse degenerative arthritis with joint space narrowing. PLAN: Incisions cleaned. Surgery was discussed at length today. Stressed to patient that nothing causes an increase in pain or swelling. Patient is weight bearing as tolerated. OK to d/c use of crutches/walker. Will set up with physical therapy. Patient does not request refill of pain medication. Patient will follow up in 3 weeks.     [facility]

## 2021-01-07 DIAGNOSIS — S83.231A COMPLEX TEAR OF MEDIAL MENISCUS OF RIGHT KNEE AS CURRENT INJURY, INITIAL ENCOUNTER: Primary | ICD-10-CM

## 2021-01-27 ENCOUNTER — OFFICE VISIT (OUTPATIENT)
Dept: ORTHOPEDIC SURGERY | Age: 56
End: 2021-01-27
Payer: OTHER GOVERNMENT

## 2021-01-27 VITALS
TEMPERATURE: 97.1 F | BODY MASS INDEX: 33.34 KG/M2 | RESPIRATION RATE: 20 BRPM | WEIGHT: 220 LBS | SYSTOLIC BLOOD PRESSURE: 122 MMHG | HEIGHT: 68 IN | HEART RATE: 88 BPM | OXYGEN SATURATION: 89 % | DIASTOLIC BLOOD PRESSURE: 79 MMHG

## 2021-01-27 DIAGNOSIS — S83.231A COMPLEX TEAR OF MEDIAL MENISCUS OF RIGHT KNEE AS CURRENT INJURY, INITIAL ENCOUNTER: Primary | ICD-10-CM

## 2021-01-27 DIAGNOSIS — M17.11 PRIMARY OSTEOARTHRITIS OF RIGHT KNEE: ICD-10-CM

## 2021-01-27 PROCEDURE — 99024 POSTOP FOLLOW-UP VISIT: CPT | Performed by: PHYSICIAN ASSISTANT

## 2021-01-27 NOTE — PROGRESS NOTES
Patient: Jackalyn Cranker  YOB: 1965       HISTORY:  The patient presents for reevaluation of her right knee status post arthroscopic partial medial menisectomy with grade III-IV chondromalacia on 12/30/2020. Patient is improved, states pain is a 1 out of 10.  she has gone to physical therapy. Patient denies any fever, chills, chest pain, shortness of breath or calf pain. The remainder of the review of systems is negative. There are no new illness or injuries to report since last seen in the office. No changes in medications, allergies, social or family history. PHYSICAL EXAMINATION:    Visit Vitals  /79   Pulse 88   Temp 97.1 °F (36.2 °C) (Temporal)   Resp 20   Ht 5' 8\" (1.727 m)   Wt 220 lb (99.8 kg)   SpO2 (!) 89%   BMI 33.45 kg/m²     The patient is a well-developed, well-nourished female in no acute distress. The patient is alert and oriented times three. The patient appears to be well groomed. Mood and affect are normal.   ORTHOPEDIC EXAM of Right knee: Inspection: Effusion present,  incisions well healed  TTP: medial joint line  Range of motion: 0-115 flexion  Stability: Stable  Strength: 5/5  2+ distal pulses    IMPRESSION:  Status post Right knee arthroscopic partial medial menisectomy with diffuse degenerative arthritis with joint space narrowing. PLAN:  1. Patient doing well post operatively  2. Will cont with PT and transition to HEP when ready  3.  Stressed no increases in pain    RTC 4 weeks    Aleksandar Snow PA-C  [facility]

## 2021-02-19 ENCOUNTER — TELEPHONE (OUTPATIENT)
Dept: ORTHOPEDIC SURGERY | Age: 56
End: 2021-02-19

## 2021-02-19 NOTE — TELEPHONE ENCOUNTER
Patient called regarding her post-op appointment for 02/24/21. She says she needs to come in on Monday instead since she has off of work on Monday. She's asking if there's any way she can be fit in and she's requesting a call back regarding this at [phone].

## 2021-02-22 ENCOUNTER — OFFICE VISIT (OUTPATIENT)
Dept: ORTHOPEDIC SURGERY | Age: 56
End: 2021-02-22
Payer: OTHER GOVERNMENT

## 2021-02-22 VITALS
BODY MASS INDEX: 33.77 KG/M2 | HEIGHT: 68 IN | DIASTOLIC BLOOD PRESSURE: 93 MMHG | TEMPERATURE: 97.3 F | SYSTOLIC BLOOD PRESSURE: 142 MMHG | HEART RATE: 68 BPM | WEIGHT: 222.8 LBS

## 2021-02-22 DIAGNOSIS — M17.11 PRIMARY OSTEOARTHRITIS OF RIGHT KNEE: ICD-10-CM

## 2021-02-22 DIAGNOSIS — S83.231A COMPLEX TEAR OF MEDIAL MENISCUS OF RIGHT KNEE AS CURRENT INJURY, INITIAL ENCOUNTER: Primary | ICD-10-CM

## 2021-02-22 DIAGNOSIS — M70.41 PREPATELLAR BURSITIS OF RIGHT KNEE: ICD-10-CM

## 2021-02-22 PROCEDURE — 99024 POSTOP FOLLOW-UP VISIT: CPT | Performed by: PHYSICIAN ASSISTANT

## 2021-02-22 NOTE — PROGRESS NOTES
Patient: Shwetha Ahmadi  YOB: 1965       HISTORY:  The patient presents for reevaluation of her right knee status post arthroscopic partial medial menisectomy with grade III-IV chondromalacia on 12/30/2020. Patient is improved, states pain is a 0 out of 10.  she has gone to physical therapy. She slipped in the parking lot and had an increase in pain. That pain has since resolved. Did take 2 dAYS off of physical therapy to help decrease discomfort. Patient denies any fever, chills, chest pain, shortness of breath or calf pain. The remainder of the review of systems is negative. There are no new illness or injuries to report since last seen in the office. No changes in medications, allergies, social or family history. PHYSICAL EXAMINATION:    Visit Vitals  BP (!) 142/93   Pulse 68   Temp 97.3 °F (36.3 °C)   Ht 5' 8\" (1.727 m)   Wt 222 lb 12.8 oz (101.1 kg)   BMI 33.88 kg/m²     The patient is a well-developed, well-nourished female in no acute distress. The patient is alert and oriented times three. The patient appears to be well groomed. Mood and affect are normal.   ORTHOPEDIC EXAM of Right knee: Inspection: Effusion not present,  incisions well healed. Pre patellar bursal swelling  TTP: none  Range of motion: 0-120 flexion  Stability: Stable  Strength: 5/5  2+ distal pulses    IMPRESSION:  Status post Right knee arthroscopic partial medial menisectomy with diffuse degenerative arthritis with joint space narrowing. Encounter Diagnoses   Name Primary?  Complex tear of medial meniscus of right knee as current injury, initial encounter Yes    Primary osteoarthritis of right knee     Prepatellar bursitis of right knee       PLAN:  1. Patient doing well post operatively  2. Will cont with PT and transition to HEP when ready  3. Stressed no increases in pain  4. Prepatellar bursitis - avoid direct pressure.  Conservative treatment for now    RTC 4 weeks If any discomfort    Bam Christina [facility]

## (undated) DEVICE — NDL SPNE MANAN 22GX6IN --

## (undated) DEVICE — MEDIA CONTRAST 10ML 200MG/ML 41%

## (undated) DEVICE — INFLOW CASSETTE TUBING, DO NOT USE IF PACKAGE IS DAMAGED: Brand: CROSSFLOW

## (undated) DEVICE — BLADE SHV 4.0MM TOMCAT --

## (undated) DEVICE — GARMENT,MEDLINE,DVT,SEQUENTIAL,CALF,MD: Brand: MEDLINE

## (undated) DEVICE — INTENDED FOR TISSUE SEPARATION, AND OTHER PROCEDURES THAT REQUIRE A SHARP SURGICAL BLADE TO PUNCTURE OR CUT.: Brand: BARD-PARKER SAFETY BLADES SIZE 11, STERILE

## (undated) DEVICE — TRAY MYEL SFTY +

## (undated) DEVICE — BLANKET WRM AD W50XL85.8IN PACU FULL BODY FORC AIR

## (undated) DEVICE — (D)BNDG ADHESIVE FABRIC 3/4X3 -- DISC BY MFR USE ITEM 357960

## (undated) DEVICE — POSITIONER ARTHSCP KNEE HLDR WHT W/O CVR

## (undated) DEVICE — SUTURE MCRYL SZ 4-0 L18IN ABSRB UD L19MM PS-2 3/8 CIR PRIM Y496G

## (undated) DEVICE — SYR 10ML LUER LOK 1/5ML GRAD --

## (undated) DEVICE — ZIMMER® STERILE DISPOSABLE TOURNIQUET CUFF WITH PROTECTIVE SLEEVE AND PLC, DUAL PORT, SINGLE BLADDER, 34 IN. (86 CM)

## (undated) DEVICE — [TOMCAT CUTTER, ARTHROSCOPIC SHAVER BLADE,  DO NOT RESTERILIZE,  DO NOT USE IF PACKAGE IS DAMAGED,  KEEP DRY,  KEEP AWAY FROM SUNLIGHT]: Brand: FORMULA

## (undated) DEVICE — PREP SKN CHLRAPRP APL 26ML STR --

## (undated) DEVICE — SOLUTION IRRIG 3000ML 0.9% SOD CHL FLX CONT 0797208] ICU MEDICAL INC]

## (undated) DEVICE — APPLICATOR BNDG 1MM ADH PREMIERPRO EXOFIN

## (undated) DEVICE — 4-PORT MANIFOLD: Brand: NEPTUNE 2

## (undated) DEVICE — KNEE ARTHROSCOPY III-LF: Brand: MEDLINE INDUSTRIES, INC.